# Patient Record
Sex: MALE | Race: WHITE | NOT HISPANIC OR LATINO | Employment: FULL TIME | URBAN - METROPOLITAN AREA
[De-identification: names, ages, dates, MRNs, and addresses within clinical notes are randomized per-mention and may not be internally consistent; named-entity substitution may affect disease eponyms.]

---

## 2017-01-30 ENCOUNTER — OFFICE VISIT (OUTPATIENT)
Dept: SLEEP MEDICINE | Facility: CLINIC | Age: 62
End: 2017-01-30
Payer: COMMERCIAL

## 2017-01-30 VITALS
OXYGEN SATURATION: 92 % | WEIGHT: 279.8 LBS | TEMPERATURE: 98.8 F | BODY MASS INDEX: 39.17 KG/M2 | HEIGHT: 71 IN | SYSTOLIC BLOOD PRESSURE: 139 MMHG | HEART RATE: 73 BPM | DIASTOLIC BLOOD PRESSURE: 75 MMHG

## 2017-01-30 DIAGNOSIS — G47.39 COMPLEX SLEEP APNEA SYNDROME: Primary | ICD-10-CM

## 2017-01-30 PROCEDURE — 99214 OFFICE O/P EST MOD 30 MIN: CPT | Performed by: PHYSICIAN ASSISTANT

## 2017-01-30 NOTE — PROGRESS NOTES
Sleep Study Follow-Up Visit:    Date on this visit: 1/30/2017    Adryan Govea comes in today for follow-up of his ASV use for severe mixed apnea. He was initially seen at the [facility] for complaints of snoring and concerns of RD. He presents today primarily to get a prescription for new supplies.  His sleep study on 11/25/2013 showed an AHI of 94/hr (143 of 211 events were mixed or central apneas), RDI 95/hr and O2 cyndy of 77.6%. He spent 28.9 min below 90% SpO2. His sleep was extremely fragmented and was subjectively much worse than at home.  He has been on ASV with settings of Min EPAP 5 cm, Max EPAP 12 cm, Min PS 0, Max PS 7 cm, and max pressure 12 cm, rate auto.  He has gained 30 pounds in the last year.  He is sleeping pretty well. He sometimes wakes early. He stopped using Advil PM, for the most part. He was having some morning grogginess and dizziness from it. He gets 6-7 hours per night. He switched from a full face to a nasal mask. He was getting a ring around his face from the mask. He also got a damp face. He denies dry mouth. He is not snoring with it. He has not gotten new supplies in about a year.  The compliance data shows that he has used the ASV for 29/30 nights, 93.3% of nights for >4 hours.  The 90th% EPAP is 9.7 cm, avg EPAP is 7.4 cm, 90th% PS is 2.3 cm, avg PS is 1.1 cm.  The average time in large leak is 0 sec.  The average nightly usage is 6:35.  The average AHI is 5.3/hr, 3.9/hr are hypopneas. The download shows that some of the nights with early awakenings are preceded by a night with 8-10 hours of sleep.      Past medical/surgical history, family history, social history, medications and allergies were reviewed.      Problem List:  Patient Active Problem List    Diagnosis Date Noted     History of basal cell carcinoma 01/29/2016     Priority: Medium     Skin, left posterior superior shoulder, excision  - Basal cell carcinoma, completely excised 1/20/2016       Family  history of melanoma 01/19/2016     Priority: Medium     Family history of nonmelanoma skin cancer 01/19/2016     Priority: Medium     History of colonic polyps 01/27/2015     Priority: Medium     Problem list name updated by automated process. Provider to review       Complex sleep apnea syndrome 01/20/2014     Priority: Medium     Snoring 10/30/2013     Priority: Medium     Presbyacusis 10/30/2013     Priority: Medium     Obesity 09/09/2013     Priority: Medium     Erectile dysfunction      Priority: Medium     Hyperlipidemia with target LDL less than 100      Priority: Medium     Diagnosis updated by automated process. Provider to review and confirm.          Impression/Plan:    (G47.31) Complex sleep apnea syndrome  (primary encounter diagnosis)  Comment: He is doing well with ASV. His AHI was 5.3/hr, mostly hypopneas. There was a few days in the middle of the month where his AHI was increased. In the last week or two, his AHI has been <5/hr. He sometimes wakes early, but does not seem particularly bothered by it.  Plan: Comprehensive DME        Continue ASV  Min EPAP 5 cm, Max EPAP 12 cm, Min PS 0, Max PS 7 cm, and max pressure 12 cm, rate auto. I prescribed new supplies. We spent several minutes looking at his sleep schedule to identify reasons he sometimes wakes early. He was encouraged to make sure he is not sleeping too long on some nights, and encouraged to keep a consistent bedtime and wake time.    He will follow up with me in about 2 year(s).     Twenty-five minutes spent with patient, all of which were spent face-to-face counseling, consulting, coordinating plan of care.      Bennett Goltz, PA-C    CC: No ref. provider found

## 2017-01-30 NOTE — NURSING NOTE
"Chief Complaint   Patient presents with     Sleep Problem     follow up       Initial /75 mmHg  Pulse 73  Temp(Src) 98.8  F (37.1  C) (Oral)  Ht 1.803 m (5' 11\")  Wt 126.916 kg (279 lb 12.8 oz)  BMI 39.04 kg/m2  SpO2 92% Estimated body mass index is 39.04 kg/(m^2) as calculated from the following:    Height as of this encounter: 1.803 m (5' 11\").    Weight as of this encounter: 126.916 kg (279 lb 12.8 oz).  BP completed using cuff size: regular right arm   Kaya Dye MA  [facility]      "

## 2017-01-30 NOTE — PATIENT INSTRUCTIONS

## 2017-08-12 ENCOUNTER — HEALTH MAINTENANCE LETTER (OUTPATIENT)
Age: 62
End: 2017-08-12

## 2018-05-18 DIAGNOSIS — G47.33 OSA (OBSTRUCTIVE SLEEP APNEA): Primary | ICD-10-CM

## 2018-06-11 ENCOUNTER — DOCUMENTATION ONLY (OUTPATIENT)
Dept: SLEEP MEDICINE | Facility: CLINIC | Age: 63
End: 2018-06-11

## 2018-06-11 DIAGNOSIS — G47.39 COMPLEX SLEEP APNEA SYNDROME: Primary | ICD-10-CM

## 2018-06-11 NOTE — PROGRESS NOTES
Patient came in today to [facility] have us look at his ASV machine. He said he has been getting alarms in the middle of the night and the machine will shut off. He didn't have his modem attached, so I did a manual download and scanned into [employer]. His alarms are turned off, but on [employer] machines there are some internal alarms we can not adjust. After looking at his download, and his pressure settings, I'm wondering if he was hitting his max pressure, and the machine was triggered somehow. His pressures were set to Epap min 5, max 12. Pressure support min 0, max 7 with a max pressure of 12. I think he may need a higher max pressure to see if that helps. If that doesn't take care of the issues he is having, we will send his machine in for repairs and give him a loaner to use while his is being repaired. We can also work on getting a prior auth from his insurance company to get his machine replaced early. (His 5 years is up in December of this year). I will send a pressure change to Bennett Goltz, PAC Anna, RRT  [facility]

## 2018-06-12 NOTE — PROGRESS NOTES
A download was obtained from his machine.   The compliance data shows that he has used the ASV for 30/30 nights, 86.7% of nights for >4 hours.  The 90th% EPAP is 10.3 cm, avg EPAP 8.1 cm. The 90th% PS is 2 cm, avg PS 1 cm.  The average time in large leak is 0 sec.  The average nightly usage is 5:37.  The average AHI is 4.4/hr. He spends 2.5% of the night in periodic breathing. His tidal volume is 510 mL on average. His resp rate is 19.7 bpm. There are occasions when his pressures hit the max pressure limit of 12 cm, primarily in response to hypopneas.  It appears he has regained about 40 pounds since 2015. His obstruction may be worsened.  Orders placed to change pressures back to: Epap min 5, max 15. Pressure support min 0, max 10 with a max pressure of 15 cm.

## 2018-07-06 ENCOUNTER — DOCUMENTATION ONLY (OUTPATIENT)
Dept: SLEEP MEDICINE | Facility: CLINIC | Age: 63
End: 2018-07-06

## 2018-07-06 NOTE — PROGRESS NOTES
"Patient arrived to [facility] accompanied by his wife on 7/2/18. He brings in his sleep machine, [employer] System One 60S BIPAP ASV reporting alarm activating with error message \"in-operable service required\". Machine was previously repaired 1/7/2018. Loaner machine provided. Pt is eligible for replacement machine 12/18/2018. Will submit prior authorization request for early replacement. Device may be more cost effective to replace vs repair. Pt would also like to f/u with sleep provider to discuss therapy and will call clinic to schedule.   "

## 2018-07-09 ENCOUNTER — TRANSFERRED RECORDS (OUTPATIENT)
Dept: HEALTH INFORMATION MANAGEMENT | Facility: CLINIC | Age: 63
End: 2018-07-09

## 2018-07-16 ENCOUNTER — OFFICE VISIT (OUTPATIENT)
Dept: SLEEP MEDICINE | Facility: CLINIC | Age: 63
End: 2018-07-16
Payer: COMMERCIAL

## 2018-07-16 ENCOUNTER — TELEPHONE (OUTPATIENT)
Dept: FAMILY MEDICINE | Facility: CLINIC | Age: 63
End: 2018-07-16

## 2018-07-16 VITALS
DIASTOLIC BLOOD PRESSURE: 76 MMHG | WEIGHT: 283 LBS | OXYGEN SATURATION: 97 % | BODY MASS INDEX: 39.62 KG/M2 | HEART RATE: 71 BPM | HEIGHT: 71 IN | SYSTOLIC BLOOD PRESSURE: 119 MMHG | RESPIRATION RATE: 16 BRPM

## 2018-07-16 DIAGNOSIS — G47.39 COMPLEX SLEEP APNEA SYNDROME: Primary | ICD-10-CM

## 2018-07-16 PROCEDURE — 99214 OFFICE O/P EST MOD 30 MIN: CPT | Performed by: PHYSICIAN ASSISTANT

## 2018-07-16 NOTE — PATIENT INSTRUCTIONS

## 2018-07-16 NOTE — NURSING NOTE
"No chief complaint on file.      Initial /76  Pulse 71  Resp 16  Ht 1.803 m (5' 11\")  Wt 128.4 kg (283 lb)  SpO2 97%  BMI 39.47 kg/m2 Estimated body mass index is 39.47 kg/(m^2) as calculated from the following:    Height as of this encounter: 1.803 m (5' 11\").    Weight as of this encounter: 128.4 kg (283 lb).    Medication Reconciliation: complete    ESS 1    Megan Florez MA      "

## 2018-07-16 NOTE — MR AVS SNAPSHOT
After Visit Summary   7/16/2018    Adryan Govea    MRN: [id number]           Patient Information     Date Of Birth          1955        Visit Information        Provider Department      7/16/2018 2:00 PM Goltz, Bennett Ezra, PA-C [facility]        Today's Diagnoses     Complex sleep apnea syndrome    -  1      Care Instructions      Your BMI is Body mass index is 39.47 kg/(m^2).  Weight management is a personal decision.  If you are interested in exploring weight loss strategies, the following discussion covers the approaches that may be successful. Body mass index (BMI) is one way to tell whether you are at a healthy weight, overweight, or obese. It measures your weight in relation to your height.  A BMI of 18.5 to 24.9 is in the healthy range. A person with a BMI of 25 to 29.9 is considered overweight, and someone with a BMI of 30 or greater is considered obese. More than two-thirds of American adults are considered overweight or obese.  Being overweight or obese increases the risk for further weight gain. Excess weight may lead to heart disease and diabetes.  Creating and following plans for healthy eating and physical activity may help you improve your health.  Weight control is part of healthy lifestyle and includes exercise, emotional health, and healthy eating habits. Careful eating habits lifelong are the mainstay of weight control. Though there are significant health benefits from weight loss, long-term weight loss with diet alone may be very difficult to achieve- studies show long-term success with dietary management in less than 10% of people. Attaining a healthy weight may be especially difficult to achieve in those with severe obesity. In some cases, medications, devices and surgical management might be considered.  What can you do?  If you are overweight or obese and are interested in methods for weight loss, you should discuss this with your provider.      Consider reducing daily calorie intake by 500 calories.     Keep a food journal.     Avoiding skipping meals, consider cutting portions instead.    Diet combined with exercise helps maintain muscle while optimizing fat loss. Strength training is particularly important for building and maintaining muscle mass. Exercise helps reduce stress, increase energy, and improves fitness. Increasing exercise without diet control, however, may not burn enough calories to loose weight.       Start walking three days a week 10-20 minutes at a time    Work towards walking thirty minutes five days a week     Eventually, increase the speed of your walking for 1-2 minutes at time    In addition, we recommend that you review healthy lifestyles and methods for weight loss available through the National Institutes of Health patient information sites:  http://win.niddk.nih.gov/publications/index.htm    And look into health and wellness programs that may be available through your health insurance provider, employer, local community center, or rosy club.    Weight management plan: Patient was referred to their PCP to discuss a diet and exercise plan.              Follow-ups after your visit        Your next 10 appointments already scheduled     Jul 26, 2018  8:30 AM CDT   Return Sleep Patient with Bennett Ezra Goltz, PA-C   [facility] ([facility])    [address] [place] [address]   [phone]            Sep 10, 2018  1:00 PM CDT   New Patient Visit with Rodriguez Eric MD   [facility])    [address]  [facility] [address]   [phone]              Who to contact     If you have questions or need follow up information about today's clinic visit or your schedule please contact [facility] directly at [phone].  Normal or non-critical lab and imaging results will be  "communicated to you by MyChart, letter or phone within 4 business days after the clinic has received the results. If you do not hear from us within 7 days, please contact the clinic through [employer] or phone. If you have a critical or abnormal lab result, we will notify you by phone as soon as possible.  Submit refill requests through [employer] or call your pharmacy and they will forward the refill request to us. Please allow 3 business days for your refill to be completed.          Additional Information About Your Visit        [employer] Information     [employer] gives you secure access to your electronic health record. If you see a primary care provider, you can also send messages to your care team and make appointments. If you have questions, please call your primary care clinic.  If you do not have a primary care provider, please call [phone] and they will assist you.        Care EveryWhere ID     This is your Care EveryWhere ID. This could be used by other organizations to access your [place] medical records  [id number]        Your Vitals Were     Pulse Respirations Height Pulse Oximetry BMI (Body Mass Index)       71 16 1.803 m (5' 11\") 97% 39.47 kg/m2        Blood Pressure from Last 3 Encounters:   07/16/18 119/76   01/30/17 139/75   10/06/16 130/80    Weight from Last 3 Encounters:   07/16/18 128.4 kg (283 lb)   01/30/17 126.9 kg (279 lb 12.8 oz)   10/06/16 126.1 kg (278 lb)              We Performed the Following     Comprehensive DME        Primary Care Provider Office Phone # Fax #    Anthony Jake Duckworth [phone] [address] [facility] [address]        Equal Access to Services     [facility] : Hadii aad ku hadasho Soadalbertoali, waaxda luqadaha, qaybta kaalmada berthayada, felipe retana . So [facility] [phone].    ATENCIÓN: Si habla español, tiene a jesus disposición servicios gratuitos de asistencia lingüística. Llame al [phone].    We comply " with applicable federal civil rights laws and [place] laws. We do not discriminate on the basis of race, color, national origin, age, disability, sex, sexual orientation, or gender identity.            Thank you!     Thank you for choosing [facility]  for your care. Our goal is always to provide you with excellent care. Hearing back from our patients is one way we can continue to improve our services. Please take a few minutes to complete the written survey that you may receive in the mail after your visit with us. Thank you!             Your Updated Medication List - Protect others around you: Learn how to safely use, store and throw away your medicines at www.disposemymeds.org.          This list is accurate as of 7/16/18  2:55 PM.  Always use your most recent med list.                   Brand Name Dispense Instructions for use Diagnosis    clobetasol 0.05 % cream    TEMOVATE    15 g    Apply sparingly to affected area twice daily for 14 days.  Do not apply to face.    Other eczema       simvastatin 40 MG tablet    ZOCOR    90 tablet    Take 1 tablet (40 mg) by mouth At Bedtime    Mixed hyperlipidemia

## 2018-07-16 NOTE — TELEPHONE ENCOUNTER
Panel Management Review      Patient has the following on his problem list: None      Composite cancer screening  Chart review shows that this patient is due/due soon for the following None  Summary:    Patient is due/failing the following:   PHYSICAL    Action needed:   Patient needs office visit for physical  .    Type of outreach:    Sent my chart message    Questions for provider review:    None                                                                                                                                    Arlene Rolle CMA

## 2018-07-16 NOTE — PROGRESS NOTES
ASV Follow-Up Visit:    Date on this visit: 7/16/2018    Adryan Govea comes in today for follow-up of his ASV use for severe mixed apnea. He was initially seen at the [facility] for complaints of snoring and concerns of RD.   His sleep study on 11/25/2013 showed an AHI of 94/hr (143 of 211 events were mixed or central apneas), RDI 95/hr and O2 cyndy of 77.6%. He spent 28.9 min below 90% SpO2. His sleep was extremely fragmented and was subjectively much worse than at home.  He is on ASV with pressures: Epap min 5, max 15. Pressure support min 0, max 10 with a max pressure of 15 cm.   He presents today because his ASV is broken again. He is on a loaner. It would stop working in the night and the alarms would go off. His machine was repaired earlier in the year and worked fine for a few months before breaking again. The loaner machine works just fine. His wife says he sleeps well with it. Without CPAP, he is observed to have snorts and pauses in breathing. He has a nasal mask. No dry nose or mouth. He denies mask leak. He used to get mask leak with a full face mask.   He weighed 279 at his last visit here on 1/30/2017. He weighs 283 today.  He notices gasping himself awake if he sleeps without the machine.   The compliance data from the loaner ASV shows that from 7/2/2018 to 7/15/2018 he has used the ASV for 14/14 nights, 92.9% of nights for >4 hours.  The 90th% EPAP is 9.4 cm and 90th% PS is 3.2 cm.  The average time in large leak is 0 sec.  The average nightly usage is 6:10.  He spend 1.1% of the night in periodic breathing. He triggers 97.6% of his own breaths. The average AHI is 2.6/hr (mostly hypopneas). Avg Resp rate is 18.6 bpm, avg minute vent 9.2 lpm    Past medical/surgical history, family history, social history, medications and allergies were reviewed.      Problem List:  Patient Active Problem List    Diagnosis Date Noted     History of basal cell carcinoma 01/29/2016     Priority:  Medium     Skin, left posterior superior shoulder, excision  - Basal cell carcinoma, completely excised 1/20/2016       Family history of melanoma 01/19/2016     Priority: Medium     Family history of nonmelanoma skin cancer 01/19/2016     Priority: Medium     History of colonic polyps 01/27/2015     Priority: Medium     Problem list name updated by automated process. Provider to review       Complex sleep apnea syndrome 01/20/2014     Priority: Medium     Snoring 10/30/2013     Priority: Medium     Presbyacusis 10/30/2013     Priority: Medium     Obesity 09/09/2013     Priority: Medium     Erectile dysfunction      Priority: Medium     Hyperlipidemia with target LDL less than 100      Priority: Medium     Diagnosis updated by automated process. Provider to review and confirm.          Impression/Plan:    (G47.31) Complex sleep apnea syndrome  (primary encounter diagnosis)  Comment: Adryan's ASV broke. He may not be due for coverage of a new machine until December. He would like to know if he really needs the ASV or if a CPAP would suffice. His download shows he is triggering 97.6% of his own breaths and the 90th% PS is 3 cm. It appears to give him the PS in response to hypopneas.   Plan: Comprehensive DME        I changed his machine so that the EPAP will vary from 6 to 15 cm and the PS will be fixed at 0, so the machine will act like a CPAP. I will have him use this for 2 weeks to see if he has increased AHI (centrals in particular). If he does, I will recommend he stay with ASV. If his AHI is well controlled, he may do fine with CPAP when he gets a new machine.       He will follow up with me in about 2 week(s). He will be out of town in [place] and [place] after that, and I think he would benefit from ASV at altitude.    Twenty-five minutes spent with patient, all of which were spent face-to-face counseling, consulting, coordinating plan of care.      Bennett Goltz, PA-C    CC: No ref. provider found

## 2018-07-16 NOTE — LETTER
July 16, 2018    Adryan Govea  [address]   [facility] [address]    Dear Chelsey Cornelius cares about your health and your health plan.  I have reviewed your medical conditions, medication list and lab results, and am making recommendations based on this review to better manage your health.    You are in particular need of attention regarding:  -Wellness (Physical) Visit     I am recommending that you:     -schedule a WELLNESS (Physical) APPOINTMENT with me.   I will check fasting labs the same day - nothing to eat except water and meds for 8-10 hours prior.      Please call us at the [facility] location:  [phone] or use [employer] to address the above recommendations.     Thank you for trusting [facility].  We appreciate the opportunity to serve you and look forward to supporting your healthcare in the future.    If you have (or plan to have) any of these tests done at a facility other than a [facility] or a [facility], please have the results sent to the [facility] location noted above.      Best Regards,    Anthony Duckworth MD

## 2018-07-18 DIAGNOSIS — G47.39 COMPLEX SLEEP APNEA SYNDROME: Primary | ICD-10-CM

## 2018-07-18 NOTE — PROGRESS NOTES
Adryan brought his ASV in today asking that the settings be changed back to ASV. I had set it to CPAP 6-15 cm (I had the PS fixed at 0). He felt he was not getting enough air and he struggled to sleep. The download does not look particularly different from preceding days with his prior ASV settings.   I have changed his machine back to the previous settings and placed an order for a replacement machine.  Bennett Goltz, PA-C

## 2018-08-13 ENCOUNTER — DOCUMENTATION ONLY (OUTPATIENT)
Dept: SLEEP MEDICINE | Facility: CLINIC | Age: 63
End: 2018-08-13

## 2018-08-13 NOTE — PROGRESS NOTES
Patient seen at [facility] today 8/13/18. He returned loaner ASV device. His unit was sent to [employer] and repaired under warranty at no cost. Device set to new settings per CMN. Pt has opted to wait to replace device. He has not met his deductible. Pt will f/u with [facility] when he decides to move forward with replacement. [employer] applied to device for monitoring.

## 2018-09-10 ENCOUNTER — OFFICE VISIT (OUTPATIENT)
Dept: FAMILY MEDICINE | Facility: CLINIC | Age: 63
End: 2018-09-10
Payer: COMMERCIAL

## 2018-09-10 VITALS
HEIGHT: 71 IN | OXYGEN SATURATION: 96 % | SYSTOLIC BLOOD PRESSURE: 136 MMHG | HEART RATE: 57 BPM | TEMPERATURE: 97.9 F | WEIGHT: 283.04 LBS | BODY MASS INDEX: 39.62 KG/M2 | DIASTOLIC BLOOD PRESSURE: 87 MMHG

## 2018-09-10 DIAGNOSIS — Z11.59 NEED FOR HEPATITIS C SCREENING TEST: Primary | ICD-10-CM

## 2018-09-10 DIAGNOSIS — M25.551 HIP PAIN, RIGHT: ICD-10-CM

## 2018-09-10 DIAGNOSIS — E66.9 OBESITY, UNSPECIFIED OBESITY SEVERITY, UNSPECIFIED OBESITY TYPE: ICD-10-CM

## 2018-09-10 DIAGNOSIS — E78.5 HYPERLIPIDEMIA WITH TARGET LDL LESS THAN 100: ICD-10-CM

## 2018-09-10 DIAGNOSIS — Z12.5 SCREENING FOR PROSTATE CANCER: ICD-10-CM

## 2018-09-10 DIAGNOSIS — Z13.1 SCREENING FOR DIABETES MELLITUS: ICD-10-CM

## 2018-09-10 LAB
ALBUMIN SERPL-MCNC: 4 G/DL (ref 3.2–4.5)
ALP SERPL-CCNC: 62 U/L (ref 40–150)
ALT SERPL-CCNC: 65 U/L (ref 0–70)
AST SERPL-CCNC: 50 U/L (ref 0–55)
BILIRUB SERPL-MCNC: 0.9 MG/DL (ref 0.2–1.3)
BUN SERPL-MCNC: 14 MG/DL (ref 7–30)
CALCIUM SERPL-MCNC: 9.5 MG/DL (ref 8.5–10.4)
CHLORIDE SERPLBLD-SCNC: 100 MMOL/L (ref 94–109)
CHOLEST SERPL-MCNC: 274 MG/DL (ref 0–200)
CHOLEST/HDLC SERPL: 5.3 {RATIO} (ref 0–5)
CO2 SERPL-SCNC: 30 MMOL/L (ref 20–32)
CREAT SERPL-MCNC: 1.1 MG/DL (ref 0.8–1.5)
EGFR CALCULATED (BLACK REFERENCE): 86.9
EGFR CALCULATED (NON BLACK REFERENCE): 71.9
FASTING SPECIMEN: YES
GLUCOSE SERPL-MCNC: 95 MG/DL (ref 60–109)
HDLC SERPL-MCNC: 52 MG/DL
LDLC SERPL CALC-MCNC: 183 MG/DL (ref 0–129)
POTASSIUM SERPL-SCNC: 4.4 MMOL/L (ref 3.4–5.3)
PROT SERPL-MCNC: 7.5 G/DL (ref 6.8–8.8)
PSA SERPL-ACNC: 0.74 UG/L (ref 0–4)
SODIUM SERPL-SCNC: 144 MMOL/L (ref 133–144)
TRIGL SERPL-MCNC: 200 MG/DL (ref 0–150)
VLDL-CHOLESTEROL: 40 (ref 7–32)

## 2018-09-10 RX ORDER — ATORVASTATIN CALCIUM 20 MG/1
20 TABLET, FILM COATED ORAL DAILY
Qty: 90 TABLET | Refills: 1 | Status: SHIPPED | OUTPATIENT
Start: 2018-09-10 | End: 2020-08-05

## 2018-09-10 ASSESSMENT — PAIN SCALES - GENERAL: PAINLEVEL: MODERATE PAIN (4)

## 2018-09-10 NOTE — NURSING NOTE
"63 year old  Chief Complaint   Patient presents with     [facility] Care     Physical       Blood pressure 136/87, pulse 57, temperature 97.9  F (36.6  C), temperature source Oral, height 5' 11.2\" (180.8 cm), weight 283 lb 0.6 oz (128.4 kg), SpO2 96 %. Body mass index is 39.25 kg/(m^2).  Patient Active Problem List   Diagnosis     Erectile dysfunction     Hyperlipidemia with target LDL less than 100     Obesity     Snoring     Presbyacusis     Complex sleep apnea syndrome     History of colonic polyps     Family history of melanoma     Family history of nonmelanoma skin cancer     History of basal cell carcinoma       Wt Readings from Last 2 Encounters:   09/10/18 283 lb 0.6 oz (128.4 kg)   07/16/18 283 lb (128.4 kg)     BP Readings from Last 3 Encounters:   09/10/18 136/87   07/16/18 119/76   01/30/17 139/75         Current Outpatient Prescriptions   Medication     clobetasol (TEMOVATE) 0.05 % cream     simvastatin (ZOCOR) 40 MG tablet     No current facility-administered medications for this visit.        Social History   Substance Use Topics     Smoking status: Current Some Day Smoker     Types: Cigars     Smokeless tobacco: Never Used     Alcohol use Yes       Health Maintenance Due   Topic Date Due     HEPATITIS C SCREENING  01/25/1973     ADVANCE DIRECTIVE PLANNING Q5 YRS  01/25/2010     PHQ-2 Q1 YR  03/09/2017     INFLUENZA VACCINE (1) 09/01/2018       No results found for: NOA Rao CMA  September 10, 2018 1:01 PM  "

## 2018-09-10 NOTE — MR AVS SNAPSHOT
"              After Visit Summary   9/10/2018    Adryan Govea    MRN: [id number]           Patient Information     Date Of Birth          1955        Visit Information        Provider Department      9/10/2018 1:00 PM Rodriguez Eric MD [facility]        Today's Diagnoses     Need for hepatitis C screening test    -  1    Hyperlipidemia with target LDL less than 100        Screening for prostate cancer        Screening for diabetes mellitus        Obesity, unspecified obesity severity, unspecified obesity type        Hip pain, right          Care Instructions    64 yo male with obesity, elevated blood pressure today (but not chronic), hyperlipidemia (off of meds)  Also, Right hip pain, has had x-rays that were reported as normal.    Colonic polyps, followed by GI. Continue with annual colonoscopies.       Regarding the diet, recommendation would be to eat some breakfast, ideally high in protein. Also, vegetables early in the day. Minimize dessert.     Regarding activity, look into a standing work area, a physioball (65 cm, non-burst) and a stationary cycle (e.g. Recumbent) at home  Aim to increase steps to about 6000 per day. Outdoor walking is healthy  Sent to Physical therapy for the Right hip. If no improvement, then start imaging.   Also, look into [place] Walking Poles. This would be great for walking and take pressure off of the hip.     Weight goal should be about 5% in 3 months, a little over a pound/week with a goal of < 270 lbs by Dec 2018.     Will check labs and send results via the \"[employer]\" system.   Likely will need to start Statin medication.           Follow-ups after your visit        Additional Services     [facility] PT, HAND, AND CHIR[facility]RACTIC REFERRAL       **This order will print in the [facility] Scheduling Office**    Physical Therapy, Hand Therapy and Chiropractic Care are available through:    *[facility]  *[facility]  *[facility] " "Care    Call one number to schedule at any of the above locations: [phone].    Your provider has referred you to: Physical Therapy at [facility] or [facility]    Indication/Reason for Referral: Right hip pain  Onset of Illness: years  Therapy Orders: Evaluate and Treat      Please be aware that coverage of these services is subject to the terms and limitations of your health insurance plan.  Call member services at your health plan with any benefit or coverage questions.      Please bring the following to your appointment:    *Your personal calendar for scheduling future appointments  *Comfortable clothing                  Who to contact     Please call your clinic at [phone] to:    Ask questions about your health    Make or cancel appointments    Discuss your medicines    Learn about your test results    Speak to your doctor            Additional Information About Your Visit        [employer] Information     [employer] gives you secure access to your electronic health record. If you see a primary care provider, you can also send messages to your care team and make appointments. If you have questions, please call your primary care clinic.  If you do not have a primary care provider, please call [phone] and they will assist you.      [employer] is an electronic gateway that provides easy, online access to your medical records. With [employer], you can request a clinic appointment, read your test results, renew a prescription or communicate with your care team.     To access your existing account, please contact your [facility] or call [phone] for assistance.        Care EveryWhere ID     This is your Care EveryWhere ID. This could be used by other organizations to access your [facility] medical records  [id number]        Your Vitals Were     Pulse Temperature Height Pulse Oximetry BMI (Body Mass Index)       57 97.9  F (36.6  C) (Oral) 5' 11.2\" (180.8 cm) 96% 39.25 kg/m2        Blood " Pressure from Last 3 Encounters:   09/10/18 136/87   07/16/18 119/76   01/30/17 139/75    Weight from Last 3 Encounters:   09/10/18 283 lb 0.6 oz (128.4 kg)   07/16/18 283 lb (128.4 kg)   01/30/17 279 lb 12.8 oz (126.9 kg)              We Performed the Following     Comprehensive Metabolic Panel ([place])     Hepatitis C Screen Reflex to HCV RNA Quant and Genotype     [facility], AND CH[facility]RACT[facility] REFERRAL     Lipid Panel ([place])     Prostate spec antigen screen        Primary Care Provider Office Phone # Fax #    Anthony Duckworth [phone] [address] [facility] [address]        Equal Access to Services     KODI OCAMPO : Hadii sienna vicenteo Sobrenda, waaxda luqadaha, qaybta kaalmada adeegyada, felipe low. So [facility] [phone].    ATENCIÓN: Si habla español, tiene a jesus disposición servicios gratuitos de asistencia lingüística. Llame al [phone].    We comply with applicable federal civil rights laws and [place] laws. We do not discriminate on the basis of race, color, national origin, age, disability, sex, sexual orientation, or gender identity.            Thank you!     Thank you for choosing [facility]  for your care. Our goal is always to provide you with excellent care. Hearing back from our patients is one way we can continue to improve our services. Please take a few minutes to complete the written survey that you may receive in the mail after your visit with us. Thank you!             Your Updated Medication List - Protect others around you: Learn how to safely use, store and throw away your medicines at www.disposemymeds.org.          This list is accurate as of 9/10/18  1:51 PM.  Always use your most recent med list.                   Brand Name Dispense Instructions for use Diagnosis    clobetasol 0.05 % cream    TEMOVATE    15 g    Apply sparingly to affected area twice daily for 14 days.  Do not apply to face.     Other eczema       simvastatin 40 MG tablet    ZOCOR    90 tablet    Take 1 tablet (40 mg) by mouth At Bedtime    Mixed hyperlipidemia

## 2018-09-10 NOTE — PATIENT INSTRUCTIONS
"64 yo male with obesity, elevated blood pressure today (but not chronic), hyperlipidemia (off of meds)  Also, Right hip pain, has had x-rays that were reported as normal.    Colonic polyps, followed by GI. Continue with annual colonoscopies.       Regarding the diet, recommendation would be to eat some breakfast, ideally high in protein. Also, vegetables early in the day. Minimize dessert.     Regarding activity, look into a standing work area, a physioball (65 cm, non-burst) and a stationary cycle (e.g. Recumbent) at home  Aim to increase steps to about 6000 per day. Outdoor walking is healthy  Sent to Physical therapy for the Right hip. If no improvement, then start imaging.   Also, look into [place] Walking Poles. This would be great for walking and take pressure off of the hip.     Weight goal should be about 5% in 3 months, a little over a pound/week with a goal of < 270 lbs by Dec 2018.     Will check labs and send results via the \"[employer]\" system.   Likely will need to start Statin medication.   "

## 2018-09-10 NOTE — PROGRESS NOTES
REASON FOR VISIT: Annual Physical, Establish Care      HISTORY OF PRESENT ILLNESS: Adryan Govea is a 63 year old male who presents for an annual physical. Adryan is new to [facility]. His previous PCP Dr. Anthony Jenkins at [facility] is retiring. He was recently remarried, his wife (Cassandra) is also establishing care at [facility].     He reports RIGHT hip pain for 5-10 years. Worsening over the past two years. He has had x-rays twice of his hip with orthopedics, his last x-ray was over 1 year ago. He has not done physical therapy in the past. He has been having massages regularly. He does report numbness down his RIGHT leg when walking.     Adryan has known high cholesterol, he has not been taking a statin. No history of heart attacks or strokes.     He has sleep apnea and follows with Dr. Burks at [facility].     He reports that his weight is up from normal. He is interested in losing weight we discussed increasing his exercise level and modifying his diet.     Adryan doesn't typically eat breakfast. First meal of the day is usually lunch which is usually leftovers. He tries to eat a low carbohydrate diet with lean meats, vegetables. He does note that he overindulges on sweets. He doesn't drink any sugar sweetened beverages.     For physical activity he golfs, bikes, and sails. At the gym he will bike for 20 minutes, stretch, and some weight machines. He also downhill skis frequently. He wears a fit bit and walks around 4,000 steps a day. Exercise limited by his RIGHT hip pain. Walking is worse for his hip than a bicycle.     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  He is remarried (Cassandra), two kids age 35 and 32.        MEDICATIONS:  Not currently taking any medications. Was prescribed Simvistatin previously.        HEALTHCARE MAINTENANCE:      He smokes a cigar 3x per week. He stopped smoking cigarettes years ago.     Adryan has had  "40-50 colonic polyps removed. He sees Dr. Fu at [facility] in [place]. His last colonoscopy was a few months ago. He has annual colonoscopies.     Immunizations are up to date, denies flu vaccine today. Tdap due in 2026.     Adryan has 2 drinks per night; 4x per week.        Health Maintenance   Topic Date Due     HEPATITIS C SCREENING  01/25/1973     ADVANCE DIRECTIVE PLANNING Q5 YRS  01/25/2010     PHQ-2 Q1 YR  03/09/2017     INFLUENZA VACCINE (1) 09/01/2018     COLONOSCOPY Q1 YR  07/09/2019     LIPID SCREEN Q5 YR MALE (SYSTEM ASSIGNED)  03/09/2021     TETANUS IMMUNIZATION (SYSTEM ASSIGNED)  03/09/2026     HIV SCREEN (SYSTEM ASSIGNED)  Completed     REVIEW OF SYSTEMS:  POSITIVE for: RIGHT hip pain, history of colonic polyps, RD, known hyperlipidemia no current statin use.   No chest pain or shortness of breath.   No GI or  complaints      OBJECTIVE:    EXAM:  GENERAL: Alert, pleasant, NAD  VITAL SIGNS: /87 (BP Location: Left arm, Patient Position: Chair, Cuff Size: Adult Large)  Pulse 57  Temp 97.9  F (36.6  C) (Oral)  Ht 5' 11.2\" (180.8 cm)  Wt 283 lb 0.6 oz (128.4 kg)  SpO2 96%  BMI 39.25 kg/m2  EYES: PERRL, EOMI, conjunctiva clear  HENT: TM normal bilaterally. Nose and mouth without lesions.  NECK: No adenopathy, thyroid normal to palpation  RESP: Lungs clear to auscultation bilaterally  Axillae: No palpable axillary masses or adenopathy  CV: Regular rate and rhythm, normal S1 S2, No murmur, no carotid bruits  ABDOMEN: Soft, nontender, without HSM or masses. Bowel sounds normal    and Rectal - Deferred. Patient had no concerns and receives annual colonoscopies.   MS:RIGHT hand long digit and ring finger with few old fractures notable. Full function.  RIGHT hip: 90 degrees of flexion and 5 degrees of internal range of motion 40 degrees of external range of motion.   LEFT hip: 100 degrees of flexion. 20 degrees of internal ROM. 35 degrees of external ROM.    LEFT foot with " "calcified nodule over dorsum of midfoot, just above the navicular bone, non painful. Present for 10 years.   Calluses more on the right foot than the left.   SKIN: No suspicious lesions or rashes  NEURO: Normal strength and tone, sensory exam grossly normal.  PSYCH: Mentation appears normal. Affect normal/bright.  EXT: No peripheral edema      LABORATORY DATA: PSA 50, CMP, Lipid panel, Hepatitis C screening      ASSESSMENT AND PLAN:   62 yo male with obesity, elevated blood pressure today (but not chronic), hyperlipidemia (off of meds)  Also, Right hip pain, has had x-rays that were reported as normal.    Colonic polyps, followed by GI. Continue with annual colonoscopies.     PLAN:  Regarding the diet, recommendation would be to eat some breakfast, ideally high in protein. Also, vegetables early in the day. Minimize dessert.     Regarding activity, look into a standing work area, a physioball (65 cm, non-burst) and a stationary cycle (e.g. Recumbent) at home  Aim to increase steps to about 6000 per day. Outdoor walking is healthy  Sent to Physical therapy for the Right hip. If no improvement, then start imaging.   Also, look into [place] Walking Poles. This would be great for walking and take pressure off of the hip.     Weight goal should be about 5% in 3 months, a little over a pound/week with a goal of < 270 lbs by Dec 2018.     Will check labs and send results via the \"[employer]\" system.   Likely will need to start Statin medication.     Call with any problems or questions.       I, Leela Edwards, am serving as a scribe to document services personally performed by Dr. Rodriguez Eric, based on data collection and the provider's statements to me.     --Rodriguez Eric MD  "

## 2018-09-11 LAB — HCV AB SERPL QL IA: NONREACTIVE

## 2018-09-17 ENCOUNTER — THERAPY VISIT (OUTPATIENT)
Dept: PHYSICAL THERAPY | Facility: CLINIC | Age: 63
End: 2018-09-17
Attending: FAMILY MEDICINE
Payer: COMMERCIAL

## 2018-09-17 DIAGNOSIS — M25.551 HIP PAIN, RIGHT: Primary | ICD-10-CM

## 2018-09-17 PROCEDURE — 97110 THERAPEUTIC EXERCISES: CPT | Mod: GP | Performed by: PHYSICAL THERAPIST

## 2018-09-17 PROCEDURE — 97112 NEUROMUSCULAR REEDUCATION: CPT | Mod: GP | Performed by: PHYSICAL THERAPIST

## 2018-09-17 PROCEDURE — 97161 PT EVAL LOW COMPLEX 20 MIN: CPT | Mod: GP | Performed by: PHYSICAL THERAPIST

## 2018-09-17 ASSESSMENT — ACTIVITIES OF DAILY LIVING (ADL)
STEPPING_UP_AND_DOWN_CURBS: NO DIFFICULTY AT ALL
DEEP_SQUATTING: SLIGHT DIFFICULTY
WALKING_DOWN_STEEP_HILLS: SLIGHT DIFFICULTY
WALKING_APPROXIMATELY_10_MINUTES: SLIGHT DIFFICULTY
RECREATIONAL_ACTIVITIES: SLIGHT DIFFICULTY
GOING_UP_1_FLIGHT_OF_STAIRS: NO DIFFICULTY AT ALL
TWISTING/PIVOTING_ON_INVOLVED_LEG: NO DIFFICULTY AT ALL
WALKING_INITIALLY: NO DIFFICULTY AT ALL
GETTING_INTO_AND_OUT_OF_A_BATHTUB: NO DIFFICULTY AT ALL
SITTING_FOR_15_MINUTES: NO DIFFICULTY AT ALL
LIGHT_TO_MODERATE_WORK: SLIGHT DIFFICULTY
HOS_ADL_SCORE(%): 83.82
GETTING_INTO_AND_OUT_OF_AN_AVERAGE_CAR: NO DIFFICULTY AT ALL
HOS_ADL_ITEM_SCORE_TOTAL: 57
WALKING_UP_STEEP_HILLS: SLIGHT DIFFICULTY
HEAVY_WORK: MODERATE DIFFICULTY
GOING_DOWN_1_FLIGHT_OF_STAIRS: NO DIFFICULTY AT ALL
HOS_ADL_COUNT: 17
HOS_ADL_HIGHEST_POTENTIAL_SCORE: 68
STANDING_FOR_15_MINUTES: SLIGHT DIFFICULTY
HOW_WOULD_YOU_RATE_YOUR_CURRENT_LEVEL_OF_FUNCTION_DURING_YOUR_USUAL_ACTIVITIES_OF_DAILY_LIVING_FROM_0_TO_100_WITH_100_BEING_YOUR_LEVEL_OF_FUNCTION_PRIOR_TO_YOUR_HIP_PROBLEM_AND_0_BEING_THE_INABILITY_TO_PERFORM_ANY_OF_YOUR_USUAL_DAILY_ACTIVITIES?: 95
PUTTING_ON_SOCKS_AND_SHOES: NO DIFFICULTY AT ALL
WALKING_15_MINUTES_OR_GREATER: MODERATE DIFFICULTY
ROLLING_OVER_IN_BED: NO DIFFICULTY AT ALL

## 2018-09-17 NOTE — PROGRESS NOTES
[facility] Initial Evaluation  Subjective:  Patient is a 63 year old male presenting with rehab right hip hpi.   Adryan Govea is a 63 year old male with a right hip condition.  Condition occurred with:  Insidious onset.  Condition occurred: for unknown reasons.  This is a chronic condition  Pt presents to clinic with complaints of posterior R hip pain, gradually worsening over the past 2 months. Pt noticing pain/difficulty with standing and walking, with pain radiating into lateral thigh/leg. Pt had MD appointment on 9/10/18 and referred to PT. .    Patient reports pain:  Lateral and posterior.  Radiates to:  Thigh.  Pain is described as aching and is intermittent and reported as 6/10.  Associated symptoms:  Loss of motion/stiffness, numbness and tingling. Pain is worse in the P.M..  Symptoms are exacerbated by standing, walking, certain positions and bending/squatting and relieved by rest, activity/movement and NSAID's (massage).  Since onset symptoms are gradually worsening.  Special testing: none.  Previous treatment includes chiropractic.  There was no improvement following previous treatment.  General health as reported by patient is good.                  Barriers include:  None as reported by the patient.    Red flags:  None as reported by the patient.                        Objective:        Flexibility/Screens:       Lower Extremity:  Decreased left lower extremity flexibility:IT Band; Quadriceps and Hamstrings    Decreased right lower extremity flexibility:  IT Band; Quadriceps and Hamstrings               Lumbar/SI Evaluation  ROM:    AROM Lumbar:   Flexion:            To mid tibia +hamstring tightness  Ext:                    75%   Side Bend:        Left:  75%    Right:  75%  Rotation:           Left:  75%    Right:  75%   Side Glide:        Left:     Right:         Strength: poor abdominal strength  Lumbar Myotomes:  normal                Lumbar Dermtomes:   normal                Neural Tension/Mobility:      Left side:SLR; SLR w/DF or Slump  negative.   Right side:   Slump positive.  Right side:   SLR w/DF or SLR  negative.   Lumbar Palpation:      Tenderness not present at Left:    Quadratus Lumborum; Erector Spinae or PSIS  Tenderness present at Right: Piriformis  Tenderness not present at Right:  Quadratus Lumborum; Erector Spinae or PSIS                                          Hip Evaluation  Hip PROM:    Flexion: Left: 110   Right: 110 deg     Abduction: Left: 35    Right: 35    Internal Rotation: Left: 25    Right: 10  External Rotation: Left: 45    Right: 40              Hip Strength:    Flexion:   Left: 5/5   Pain:  Right: 5/5   Pain:                    Extension:  Left: 5-/5  Pain:Right: 4/5    Pain:    Abduction:  Left: 5-/5     Pain:Right: 4/5    Pain:                  Hip Special Testing:      Left hip negative for the following special tests:  Sabina's   Right hip positive for the following special tests:  Sabina'sRight hip negative for the following special tests:  Lorena or Fadir/Labrum    Hip Palpation:      Right hip tenderness present at:  hip flexors and Piriformis  Functional Testing:          Quad:      Bilateral leg squat:  Mild loss of control and excessive anterior knee excursion                  General     ROS    Assessment/Plan:    Patient is a 63 year old male with right side hip complaints.    Patient has the following significant findings with corresponding treatment plan.                Diagnosis 1:  R hip pain  Pain -  hot/cold therapy, manual therapy, self management, education and home program  Decreased ROM/flexibility - manual therapy, therapeutic exercise, therapeutic activity and home program  Decreased strength - therapeutic exercise, therapeutic activities and home program  Impaired gait - gait training, assistive devices and home program  Impaired muscle performance - neuro re-education and home program  Decreased function -  therapeutic activities and home program    Therapy Evaluation Codes:   1) History comprised of:   Personal factors that impact the plan of care:      None.    Comorbidity factors that impact the plan of care are:      Overweight, Sleep disorder/apnea and Smoking.     Medications impacting care: None.  2) Examination of Body Systems comprised of:   Body structures and functions that impact the plan of care:      Hip and Pelvis.   Activity limitations that impact the plan of care are:      Squatting/kneeling, Standing and Walking.  3) Clinical presentation characteristics are:   Stable/Uncomplicated.  4) Decision-Making    Low complexity using standardized patient assessment instrument and/or measureable assessment of functional outcome.  Cumulative Therapy Evaluation is: Low complexity.    Previous and current functional limitations:  (See Goal Flow Sheet for this information)    Short term and Long term goals: (See Goal Flow Sheet for this information)     Communication ability:  Patient appears to be able to clearly communicate and understand verbal and written communication and follow directions correctly.  Treatment Explanation - The following has been discussed with the patient:   RX ordered/plan of care  Anticipated outcomes  Possible risks and side effects  This patient would benefit from PT intervention to resume normal activities.   Rehab potential is good.    Frequency:  1 X week, once daily  Duration:  for 8 weeks  Discharge Plan:  Achieve all LTG.  Independent in home treatment program.  Return to previous functional level by discharge.  Reach maximal therapeutic benefit.    Please refer to the daily flowsheet for treatment today, total treatment time and time spent performing 1:1 timed codes.

## 2018-09-17 NOTE — PROGRESS NOTES
[facility] Initial Evaluation  Subjective:  Patient is a 63 year old male presenting with rehab left ankle/foot hpi.                                      Pertinent medical history includes:  Overweight, sleep disorder/apnea and smoking.        Current occupation is .    Primary job tasks include:  Prolonged sitting.                                Objective:  System    Physical Exam    General     ROS    Assessment/Plan:

## 2018-09-17 NOTE — LETTER
[facility]  [address] [address] [place] [id number]  [phone]    2018    Re: Adryan Govea   :   1955  MRN:  [id number]   REFERRING PHYSICIAN:   Rodriguez Eric    [facility]  Date of Initial Evaluation:  18  Visits:  Rxs Used: 1  Reason for Referral:  Hip pain, right    EVALUATION SUMMARY    [facility] Initial Evaluation  Subjective:  Patient is a 63 year old male presenting with rehab right hip hpi.   Adryan Govea is a 63 year old male with a right hip condition.  Condition occurred with:  Insidious onset.  Condition occurred: for unknown reasons.  This is a chronic condition  Pt presents to clinic with complaints of posterior R hip pain, gradually worsening over the past 2 months. Pt noticing pain/difficulty with standing and walking, with pain radiating into lateral thigh/leg. Pt had MD appointment on 9/10/18 and referred to PT. .    Patient reports pain:  Lateral and posterior.  Radiates to:  Thigh.  Pain is described as aching and is intermittent and reported as 6/10.  Associated symptoms:  Loss of motion/stiffness, numbness and tingling. Pain is worse in the P.M..  Symptoms are exacerbated by standing, walking, certain positions and bending/squatting and relieved by rest, activity/movement and NSAID's (massage).  Since onset symptoms are gradually worsening.  Special testing: none.  Previous treatment includes chiropractic.  There was no improvement following previous treatment.  General health as reported by patient is good.    Pertinent medical history includes:  Overweight, sleep disorder/apnea and smoking.    Current occupation is .  Primary job tasks include:  Prolonged sitting.    Barriers include:  None as reported by the patient.    Red flags:  None as reported by the patient.           Objective:  Flexibility/Screens:   Lower Extremity:  Decreased left lower extremity flexibility:IT  Band; Quadriceps and Hamstrings  Decreased right lower extremity flexibility:  IT Band; Quadriceps and Hamstrings       Lumbar/SI Evaluation  ROM:    AROM Lumbar:   Flexion:            To mid tibia +hamstring tightness  Ext:                    75%   Side Bend:        Left:  75%    Right:  75%  Rotation:           Left:  75%    Right:  75%   Strength: poor abdominal strength  Lumbar Myotomes:  normal  Lumbar Dermtomes:  normal  Neural Tension/Mobility:    Left side:SLR; SLR w/DF or Slump  negative.   Right side:   Slump positive.  Right side:   SLR w/DF or SLR  negative.   Lumbar Palpation:    Tenderness not present at Left:    Quadratus Lumborum; Erector Spinae or PSIS  Tenderness present at Right: Piriformis  Tenderness not present at Right:  Quadratus Lumborum; Erector Spinae or PSIS         Re: Adryan Govea   :   1955  Hip Evaluation  Hip PROM:    Flexion: Left: 110   Right: 110 deg   Abduction: Left: 35    Right: 35  Internal Rotation: Left: 25    Right: 10  External Rotation: Left: 45    Right: 40  Hip Strength:    Flexion:   Left: 5/5    Right: 5/5             Extension:  Left: 5-/5  Right: 4/5      Abduction:  Left: 5-/5    Right: 4/5        Hip Special Testing:    Left hip negative for the following special tests:  Sabina's   Right hip positive for the following special tests:  Sabina'sRight hip negative for the following special tests:  Lorena or Fadir/Labrum    Hip Palpation:    Right hip tenderness present at:  hip flexors and Piriformis  Functional Testing:    Quad:    Bilateral leg squat:  Mild loss of control and excessive anterior knee excursion     Assessment/Plan:    Patient is a 63 year old male with right side hip complaints.    Patient has the following significant findings with corresponding treatment plan.                Diagnosis 1:  R hip pain  Pain -  hot/cold therapy, manual therapy, self management, education and home program  Decreased ROM/flexibility - manual therapy, therapeutic  exercise, therapeutic activity and home program  Decreased strength - therapeutic exercise, therapeutic activities and home program  Impaired gait - gait training, assistive devices and home program  Impaired muscle performance - neuro re-education and home program  Decreased function - therapeutic activities and home program    Therapy Evaluation Codes:   1) History comprised of:   Personal factors that impact the plan of care:      None.    Comorbidity factors that impact the plan of care are:      Overweight, Sleep disorder/apnea and Smoking.     Medications impacting care: None.  2) Examination of Body Systems comprised of:   Body structures and functions that impact the plan of care:      Hip and Pelvis.   Activity limitations that impact the plan of care are:      Squatting/kneeling, Standing and Walking.  3) Clinical presentation characteristics are:   Stable/Uncomplicated.  4) Decision-Making    Low complexity using standardized patient assessment instrument and/or measureable assessment of functional outcome.  Cumulative Therapy Evaluation is: Low complexity.    Previous and current functional limitations:  (See Goal Flow Sheet for this information)    Short term and Long term goals: (See Goal Flow Sheet for this information)     Communication ability:  Patient appears to be able to clearly communicate and understand verbal and written communication and follow directions correctly.  Treatment Explanation - The following has been discussed with the patient:   RX ordered/plan of care  Anticipated outcomes  Possible risks and side effects  This patient would benefit from PT intervention to resume normal activities.   Rehab potential is good.        Re: Adryan Govea   :   1955    Frequency:  1 X week, once daily  Duration:  for 8 weeks  Discharge Plan:  Achieve all LTG.  Independent in home treatment program.  Return to previous functional level by discharge.  Reach maximal therapeutic  benefit.      Thank you for your referral.    INQUIRIES  Therapist: JANIS Bar [address] [address]  Phone: [phone]  Fax: [phone]

## 2018-09-24 ENCOUNTER — THERAPY VISIT (OUTPATIENT)
Dept: PHYSICAL THERAPY | Facility: CLINIC | Age: 63
End: 2018-09-24
Attending: FAMILY MEDICINE
Payer: COMMERCIAL

## 2018-09-24 DIAGNOSIS — M25.551 HIP PAIN, RIGHT: ICD-10-CM

## 2018-09-24 PROCEDURE — 97140 MANUAL THERAPY 1/> REGIONS: CPT | Mod: GP | Performed by: PHYSICAL THERAPIST

## 2018-09-24 PROCEDURE — 97112 NEUROMUSCULAR REEDUCATION: CPT | Mod: GP | Performed by: PHYSICAL THERAPIST

## 2018-09-24 PROCEDURE — 97110 THERAPEUTIC EXERCISES: CPT | Mod: GP | Performed by: PHYSICAL THERAPIST

## 2018-10-01 ENCOUNTER — THERAPY VISIT (OUTPATIENT)
Dept: PHYSICAL THERAPY | Facility: CLINIC | Age: 63
End: 2018-10-01
Attending: FAMILY MEDICINE
Payer: COMMERCIAL

## 2018-10-01 DIAGNOSIS — M25.551 HIP PAIN, RIGHT: ICD-10-CM

## 2018-10-01 PROCEDURE — 97112 NEUROMUSCULAR REEDUCATION: CPT | Mod: GP | Performed by: PHYSICAL THERAPIST

## 2018-10-01 PROCEDURE — 97110 THERAPEUTIC EXERCISES: CPT | Mod: GP | Performed by: PHYSICAL THERAPIST

## 2018-10-01 PROCEDURE — 97140 MANUAL THERAPY 1/> REGIONS: CPT | Mod: GP | Performed by: PHYSICAL THERAPIST

## 2019-01-02 PROBLEM — M25.551 HIP PAIN, RIGHT: Status: RESOLVED | Noted: 2018-09-17 | Resolved: 2019-01-02

## 2019-01-02 NOTE — PROGRESS NOTES
Subjective:  HPI                    Objective:  System    Physical Exam    General     ROS    Assessment/Plan:    DISCHARGE REPORT    Progress reporting period is from 9/17/18 to 10/1/18.     SUBJECTIVE  Subjective: Was able to walk more last week, with no numbness into R leg. HEP is going well. Will be getting standing work station this week for home. Overall functioning at 75% at this time.    Current Pain level: 2/10   Initial Pain level: 5/10   Changes in function: Yes, see goal flow sheet for change in function   Adverse reactions: None;   ,     Patient has failed to return to therapy so current objective findings are unknown.    OBJECTIVE  Objective: Mild palpable tenderness remains R trochanteric bursa. Cueing to decrease hamstring activation/cramping during single leg bridge      ASSESSMENT/PLAN  Updated problem list and treatment plan: Diagnosis 1:  R hip pain  Pain -  hot/cold therapy, manual therapy, self management, education and home program  Decreased ROM/flexibility - manual therapy, therapeutic exercise, therapeutic activity and home program  Decreased strength - therapeutic exercise, therapeutic activities and home program  Impaired muscle performance - neuro re-education and home program  Decreased function - therapeutic activities and home program  STG/LTGs have been met or progress has been made towards goals:  Yes (See Goal flow sheet completed today.)  Assessment of Progress: The patient's condition is improving.  Self Management Plans:  Patient has been instructed in a home treatment program.  Patient is independent in a home treatment program.  Patient  has been instructed in self management of symptoms.  I have re-evaluated this patient and find that the nature, scope, duration and intensity of the therapy is appropriate for the medical condition of the patient.  Adryan continues to require the following intervention to meet STG and LTG's: PT intervention is no longer required to meet  STG/LTG.  The patient failed to complete scheduled/ordered appointments so current information is unknown.  We will discharge this patient from PT.    Recommendations:  This patient is ready to be discharged from therapy and continue their home treatment program.    Please refer to the daily flowsheet for treatment today, total treatment time and time spent performing 1:1 timed codes.

## 2019-03-05 ENCOUNTER — OFFICE VISIT (OUTPATIENT)
Dept: DERMATOLOGY | Facility: CLINIC | Age: 64
End: 2019-03-05
Payer: COMMERCIAL

## 2019-03-05 VITALS — DIASTOLIC BLOOD PRESSURE: 81 MMHG | HEART RATE: 70 BPM | SYSTOLIC BLOOD PRESSURE: 125 MMHG

## 2019-03-05 DIAGNOSIS — L57.0 ACTINIC KERATOSIS: ICD-10-CM

## 2019-03-05 DIAGNOSIS — B35.1 ONYCHOMYCOSIS: Primary | ICD-10-CM

## 2019-03-05 DIAGNOSIS — Z85.828 HISTORY OF SKIN CANCER: ICD-10-CM

## 2019-03-05 RX ORDER — CICLOPIROX 80 MG/ML
SOLUTION TOPICAL
Qty: 6 ML | Refills: 3 | Status: SHIPPED | OUTPATIENT
Start: 2019-03-05 | End: 2019-05-15

## 2019-03-05 ASSESSMENT — PAIN SCALES - GENERAL
PAINLEVEL: NO PAIN (1)
PAINLEVEL: NO PAIN (0)

## 2019-03-05 NOTE — NURSING NOTE
Dermatology Rooming Note    Adryan Govea's goals for this visit include:   Chief Complaint   Patient presents with     Derm Problem     Adryan is here for a skin check, states no concerns today.        Nora Weeks LPN

## 2019-03-05 NOTE — PATIENT INSTRUCTIONS
Cryotherapy    What is it?    Use of a very cold liquid, such as liquid nitrogen, to freeze and destroy abnormal skin cells that need to be removed    What should I expect?    Tenderness and redness    A small blister that might grow and fill with dark purple blood. There may be crusting.    More than one treatment may be needed if the lesions do not go away.    How do I care for the treated area?    Gently wash the area with your hands when bathing.    Use a thin layer of Vaseline to help with healing. You may use a Band-Aid.     The area should heal within 7-10 days and may leave behind a pink or lighter color.     Do not use an antibiotic or Neosporin ointment.     You may take acetaminophen (Tylenol) for pain.     Call your Doctor if you have:    Severe pain    Signs of infection (warmth, redness, cloudy yellow drainage, and or a bad smell)    Questions or concerns    Who should I call with questions?       [facility]: [phone]       [facility]: [phone]       For urgent needs outside of business hours call the [facility] at [phone]        and ask for the dermatology resident on call

## 2019-03-05 NOTE — LETTER
3/5/2019       RE: Adryan Govea  [address]  [facility] [address]     Dear Colleague,    Thank you for referring your patient, Adryan Govea, to the [facility] at [facility]. Please see a copy of my visit note below.    [facility] Dermatology Note      Dermatology Problem List:  1.Basal cell carcinoma of left posterior shoulder s/p ED&C in 2016  2. History of actinic keratoses of the head and face s/p cryotherapy, cryotherapy 03/05/19  3. Onychomycosis: Prescription for ciclopirox given 03/2019, nail clipping for H&E       Encounter Date: Mar 5, 2019    CC:  Chief Complaint   Patient presents with     Derm Problem     Adryan is here for a skin check, states no concerns today.          History of Present Illness:  Mr. Adryan Govea is a 64 year old male who presents for full skin evaluation. Patient has been seen by dermatology at outside clinics before and would like to establish care here. Patient has history of BCC of the posterior left shoulder that was removed in 2016. He also reports having multiple actinic keratoses frozen off in the past on his face and scalp. He states that he has toenail fungus that he has had for years. He has tried some OTC topical therapies without improvement. He would like to discuss prescription topical therapies today. He is hesitant to use oral therapies due to concern for effect on the liver. No new lesions or areas of concern today.       Past Medical History:   Patient Active Problem List   Diagnosis     Erectile dysfunction     Hyperlipidemia with target LDL less than 100     Obesity     Snoring     Presbyacusis     Complex sleep apnea syndrome     History of colonic polyps     Family history of melanoma     Family history of nonmelanoma skin cancer     History of basal cell carcinoma     Past Medical History:   Diagnosis Date     Basal cell carcinoma      Colon polyps     Sees Dr. Fu.  Has annual colonoscopies     Erectile dysfunction      Hyperlipidemia LDL goal < 100      RD (obstructive sleep apnea)      Past Surgical History:   Procedure Laterality Date     GENITOURINARY SURGERY      vasectomy     ORTHOPEDIC SURGERY      left forearm reconstruction     SOFT TISSUE SURGERY  2016    basal cell shoulder       Social History:  Patient reports that he has been smoking cigars.  he has never used smokeless tobacco. He reports that he drinks alcohol. He reports that he does not use drugs.    Family History:  Family History   Problem Relation Age of Onset     Diabetes Mother      Dementia Mother      Cancer Sister 42        melanoma     Melanoma Sister      Substance Abuse Brother         alcohol       Medications:  Current Outpatient Medications   Medication Sig Dispense Refill     atorvastatin (LIPITOR) 20 MG tablet Take 1 tablet (20 mg) by mouth daily 90 tablet 1     clobetasol (TEMOVATE) 0.05 % cream Apply sparingly to affected area twice daily for 14 days.  Do not apply to face. (Patient not taking: Reported on 9/10/2018) 15 g 0     simvastatin (ZOCOR) 40 MG tablet Take 1 tablet (40 mg) by mouth At Bedtime (Patient not taking: Reported on 9/10/2018) 90 tablet 1     No Known Allergies      Review of Systems:  -Constitutional: Otherwise feeling well today, in usual state of health.  -Skin: As above in HPI. No additional skin concerns.    Physical exam:  Vitals: /81 (BP Location: Left arm, Patient Position: Chair, Cuff Size: Adult Large)   Pulse 70   GEN: This is a well developed, well-nourished male in no acute distress, in a pleasant mood.    SKIN: Total skin excluding the undergarment areas was performed. The exam included the head/face, neck, both arms, chest, back, abdomen, both legs, digits and/or nails.   -erythematous macules with scaling of the right side of face temporal region anterior to ear, 5 areas present  -numerous tan to light brown well demarcated macules scattered over  back, chest, abdomen, and bilateral upper extremities, few on lower extremities  -Light brown papules with rough surface and stuck on appearance, few scattered over the back  -Right foot first, third and fifth toe nail with yellow discoloration and thickening   -No other lesions of concern on areas examined.     Impression/Plan:  1. Onychomycosis of the right foot toenails: Patient presentation consistent with onychomycosis. Nail clipping completed today for confirmatory testing. Information was provided regarding oral and topical treatments. Patient is not interested in oral therapy but would like to try topical therapy. Patient was informed of the low success rates of these therapies and was understanding of this information. Print out information was given regarding topical therapies and prescription sent for therapy patient would like to try.     H&E nail clipping completed today    Prescription for ciclopirox external solution, to be applied to nails daily for 48 weeks.     2. Actinic keratosis of the right side of the face, anterior to ear    Cryotherapy procedure note (performed by faculty with medical student participation): After verbal consent and discussion of risks and benefits including but no limited to dyspigmentation/scar, blister, infection, recurrence, 5 were treated with 1-2mm freeze border for 2 cycles with liquid nitrogen. Post cryotherapy instructions were provided.     3. History of nonmelanoma skin cancer of left posterior shoulder, no clincial evidence of recurrence    Continue to monitor.    4. Seborrheic keratosis, non irritated    Benign nature was discussed.No further intervention required at this time.       CC Rodriguez Eric MD  [address] [facility] [address]  [place] [address] on close of this encounter.  Follow-up in 1 year, earlier for new or changing lesions.     Staff Involved:  I, Essie Marrufo, MS4, saw and examined the patient in the presence of   Tyrese.    Staff Physician:  I was present with the medical student who participated in the service and in the documentation of the note. I have verified the history and personally performed the physical exam and medical decision making. I agree with the assessment and plan of care as documented in the note.  The cryotherapy procedure was done together as noted above.    Gail Xiong MD  Professor and Chair  Department of Dermatology  [facility]: Phone: [phone], Fax:[phone]  [facility]: Phone: [phone], Fax: [phone]

## 2019-03-05 NOTE — PROGRESS NOTES
[facility] Dermatology Note      Dermatology Problem List:  1.Basal cell carcinoma of left posterior shoulder s/p ED&C in 2016  2. History of actinic keratoses of the head and face s/p cryotherapy, cryotherapy 03/05/19  3. Onychomycosis: Prescription for ciclopirox given 03/2019, nail clipping for H&E       Encounter Date: Mar 5, 2019    CC:  Chief Complaint   Patient presents with     Derm Problem     Adryan is here for a skin check, states no concerns today.          History of Present Illness:  Mr. Adryan Govea is a 64 year old male who presents for full skin evaluation. Patient has been seen by dermatology at outside clinics before and would like to establish care here. Patient has history of BCC of the posterior left shoulder that was removed in 2016. He also reports having multiple actinic keratoses frozen off in the past on his face and scalp. He states that he has toenail fungus that he has had for years. He has tried some OTC topical therapies without improvement. He would like to discuss prescription topical therapies today. He is hesitant to use oral therapies due to concern for effect on the liver. No new lesions or areas of concern today.       Past Medical History:   Patient Active Problem List   Diagnosis     Erectile dysfunction     Hyperlipidemia with target LDL less than 100     Obesity     Snoring     Presbyacusis     Complex sleep apnea syndrome     History of colonic polyps     Family history of melanoma     Family history of nonmelanoma skin cancer     History of basal cell carcinoma     Past Medical History:   Diagnosis Date     Basal cell carcinoma      Colon polyps     Sees Dr. Fu. Has annual colonoscopies     Erectile dysfunction      Hyperlipidemia LDL goal < 100      RD (obstructive sleep apnea)      Past Surgical History:   Procedure Laterality Date     GENITOURINARY SURGERY      vasectomy     ORTHOPEDIC SURGERY      left forearm reconstruction     SOFT TISSUE  SURGERY  2016    basal cell shoulder       Social History:  Patient reports that he has been smoking cigars.  he has never used smokeless tobacco. He reports that he drinks alcohol. He reports that he does not use drugs.    Family History:  Family History   Problem Relation Age of Onset     Diabetes Mother      Dementia Mother      Cancer Sister 42        melanoma     Melanoma Sister      Substance Abuse Brother         alcohol       Medications:  Current Outpatient Medications   Medication Sig Dispense Refill     atorvastatin (LIPITOR) 20 MG tablet Take 1 tablet (20 mg) by mouth daily 90 tablet 1     clobetasol (TEMOVATE) 0.05 % cream Apply sparingly to affected area twice daily for 14 days.  Do not apply to face. (Patient not taking: Reported on 9/10/2018) 15 g 0     simvastatin (ZOCOR) 40 MG tablet Take 1 tablet (40 mg) by mouth At Bedtime (Patient not taking: Reported on 9/10/2018) 90 tablet 1     No Known Allergies      Review of Systems:  -Constitutional: Otherwise feeling well today, in usual state of health.  -Skin: As above in HPI. No additional skin concerns.    Physical exam:  Vitals: /81 (BP Location: Left arm, Patient Position: Chair, Cuff Size: Adult Large)   Pulse 70   GEN: This is a well developed, well-nourished male in no acute distress, in a pleasant mood.    SKIN: Total skin excluding the undergarment areas was performed. The exam included the head/face, neck, both arms, chest, back, abdomen, both legs, digits and/or nails.   -erythematous macules with scaling of the right side of face temporal region anterior to ear, 5 areas present  -numerous tan to light brown well demarcated macules scattered over back, chest, abdomen, and bilateral upper extremities, few on lower extremities  -Light brown papules with rough surface and stuck on appearance, few scattered over the back  -Right foot first, third and fifth toe nail with yellow discoloration and thickening   -No other lesions of concern on  areas examined.     Impression/Plan:  1. Onychomycosis of the right foot toenails: Patient presentation consistent with onychomycosis. Nail clipping completed today for confirmatory testing. Information was provided regarding oral and topical treatments. Patient is not interested in oral therapy but would like to try topical therapy. Patient was informed of the low success rates of these therapies and was understanding of this information. Print out information was given regarding topical therapies and prescription sent for therapy patient would like to try.     H&E nail clipping completed today    Prescription for ciclopirox external solution, to be applied to nails daily for 48 weeks.     2. Actinic keratosis of the right side of the face, anterior to ear    Cryotherapy procedure note (performed by faculty with medical student participation): After verbal consent and discussion of risks and benefits including but no limited to dyspigmentation/scar, blister, infection, recurrence, 5 were treated with 1-2mm freeze border for 2 cycles with liquid nitrogen. Post cryotherapy instructions were provided.     3. History of nonmelanoma skin cancer of left posterior shoulder, no clincial evidence of recurrence    Continue to monitor.    4. Seborrheic keratosis, non irritated    Benign nature was discussed.No further intervention required at this time.       CC Rodriguez Eric MD  [address] [facility] [address]  [place] [address] on close of this encounter.  Follow-up in 1 year, earlier for new or changing lesions.     Staff Involved:  I, Essie Marrufo, MS4, saw and examined the patient in the presence of Dr. Xiong.    Staff Physician:  I was present with the medical student who participated in the service and in the documentation of the note. I have verified the history and personally performed the physical exam and medical decision making. I agree with the assessment and plan of care as documented in the  note.  The cryotherapy procedure was done together as noted above.    Gail Xiong MD  Professor and Chair  Department of Dermatology  [facility]: Phone: [phone], Fax:[phone]  [facility]: Phone: [phone], Fax: [phone]

## 2019-03-07 LAB — COPATH REPORT: NORMAL

## 2019-05-15 ENCOUNTER — OFFICE VISIT (OUTPATIENT)
Dept: FAMILY MEDICINE | Facility: CLINIC | Age: 64
End: 2019-05-15
Payer: COMMERCIAL

## 2019-05-15 VITALS
DIASTOLIC BLOOD PRESSURE: 88 MMHG | WEIGHT: 291 LBS | BODY MASS INDEX: 40.36 KG/M2 | HEART RATE: 73 BPM | TEMPERATURE: 98.3 F | SYSTOLIC BLOOD PRESSURE: 135 MMHG | OXYGEN SATURATION: 96 %

## 2019-05-15 DIAGNOSIS — Z28.39 INCOMPLETE IMMUNIZATION STATUS: Primary | ICD-10-CM

## 2019-05-15 DIAGNOSIS — B35.1 ONYCHOMYCOSIS: ICD-10-CM

## 2019-05-15 DIAGNOSIS — E78.5 HYPERLIPIDEMIA WITH TARGET LDL LESS THAN 100: ICD-10-CM

## 2019-05-15 RX ORDER — CICLOPIROX 80 MG/ML
SOLUTION TOPICAL
Qty: 6 ML | Refills: 3 | Status: SHIPPED | OUTPATIENT
Start: 2019-05-15 | End: 2019-07-29

## 2019-05-15 NOTE — PROGRESS NOTES
Adryan Govea is a 64 year old male who presents to [facility] today to request assessment for Measles immunity. He and his wife are new grandparents. There is a measles outbreak and his wife works where some have had measles.    Only other issue is request for Cicloprox for nail fungus. This was prescribed by Dermatology. Adryan feels that it is helping    Review Of Systems:  Has otherwise been in usual state of health, e.g.   Cardiovascular: negative  Respiratory: No shortness of breath, dyspnea on exertion, cough, or hemoptysis  Gastrointestinal: negative  Genitourinary: negative    Problem list per EMR:  Patient Active Problem List   Diagnosis     Erectile dysfunction     Hyperlipidemia with target LDL less than 100     Obesity     Snoring     Presbyacusis     Complex sleep apnea syndrome     History of colonic polyps     Family history of melanoma     Family history of nonmelanoma skin cancer     History of basal cell carcinoma       Current Outpatient Medications   Medication Sig Dispense Refill     atorvastatin (LIPITOR) 20 MG tablet Take 1 tablet (20 mg) by mouth daily 90 tablet 1     ciclopirox (PENLAC) 8 % external solution Apply to  affected nails daily.  Remove with alcohol every 7 days, then repeat. 6 mL 3     clobetasol (TEMOVATE) 0.05 % cream Apply sparingly to affected area twice daily for 14 days.  Do not apply to face. (Patient not taking: Reported on 9/10/2018) 15 g 0     simvastatin (ZOCOR) 40 MG tablet Take 1 tablet (40 mg) by mouth At Bedtime (Patient not taking: Reported on 9/10/2018) 90 tablet 1       No Known Allergies       EXAM    Vitals: /88 (BP Location: Right arm, Patient Position: Sitting, Cuff Size: Adult Regular)   Pulse 73   Temp 98.3  F (36.8  C) (Oral)   Wt 132 kg (291 lb)   SpO2 96%   BMI 40.36 kg/m    BMI= Body mass index is 40.36 kg/m .  Appears well.   RIGHT foot with onychomycotic appearance to toenails, especially #5      ASSESSMENT/PLAN:    1.  Onychomycosis    - ciclopirox (PENLAC) 8 % external solution; Apply to  affected nails daily.  Remove with alcohol every 7 days, then repeat.  Dispense: 6 mL; Refill: 3    2. Unknown immunization status  Check - Rubeola Antibody IgG    3. Hyperlipidemia with target LDL less than 100  FUTURE labs  - Lipid Panel ([place]); Future  - Comprehensive Metabolic Panel ([place]); Future  Follow up after labs to discuss obesity and hyperlipidemia        --Rodriguez Eric MD  [facility], Department of Family Medicine and Community Health

## 2019-05-15 NOTE — NURSING NOTE
64 year old  Chief Complaint   Patient presents with     Results     confirm immunity for measles      Refill Request     refill for ciclopirox       Blood pressure 135/88, pulse 73, temperature 98.3  F (36.8  C), temperature source Oral, weight 132 kg (291 lb), SpO2 96 %. Body mass index is 40.36 kg/m .  Patient Active Problem List   Diagnosis     Erectile dysfunction     Hyperlipidemia with target LDL less than 100     Obesity     Snoring     Presbyacusis     Complex sleep apnea syndrome     History of colonic polyps     Family history of melanoma     Family history of nonmelanoma skin cancer     History of basal cell carcinoma       Wt Readings from Last 2 Encounters:   05/15/19 132 kg (291 lb)   09/10/18 128.4 kg (283 lb 0.6 oz)     BP Readings from Last 3 Encounters:   05/15/19 135/88   03/05/19 125/81   09/10/18 136/87         Current Outpatient Medications   Medication     atorvastatin (LIPITOR) 20 MG tablet     ciclopirox (PENLAC) 8 % external solution     clobetasol (TEMOVATE) 0.05 % cream     simvastatin (ZOCOR) 40 MG tablet     No current facility-administered medications for this visit.        Social History     Tobacco Use     Smoking status: Current Some Day Smoker     Types: Cigars     Smokeless tobacco: Never Used   Substance Use Topics     Alcohol use: Yes     Drug use: No       Health Maintenance Due   Topic Date Due     ZOSTER IMMUNIZATION (1 of 2) 01/25/2005     ADVANCE DIRECTIVE PLANNING Q5 YRS  01/25/2010     PHQ-2  01/01/2019       No results found for: PAP      May 15, 2019 1:35 PM

## 2019-05-16 ENCOUNTER — OFFICE VISIT (OUTPATIENT)
Dept: OPHTHALMOLOGY | Facility: CLINIC | Age: 64
End: 2019-05-16
Attending: OPHTHALMOLOGY
Payer: COMMERCIAL

## 2019-05-16 DIAGNOSIS — H04.123 DRY EYE SYNDROME OF BOTH EYES: ICD-10-CM

## 2019-05-16 DIAGNOSIS — H25.13 AGE-RELATED NUCLEAR CATARACT OF BOTH EYES: ICD-10-CM

## 2019-05-16 DIAGNOSIS — H52.4 HYPEROPIA WITH PRESBYOPIA OF BOTH EYES: Primary | ICD-10-CM

## 2019-05-16 DIAGNOSIS — H52.03 HYPEROPIA WITH PRESBYOPIA OF BOTH EYES: Primary | ICD-10-CM

## 2019-05-16 LAB — MEV IGG SER QL IA: 6.7 AI (ref 0–0.8)

## 2019-05-16 PROCEDURE — G0463 HOSPITAL OUTPT CLINIC VISIT: HCPCS | Mod: ZF

## 2019-05-16 PROCEDURE — 92015 DETERMINE REFRACTIVE STATE: CPT | Mod: ZF

## 2019-05-16 ASSESSMENT — VISUAL ACUITY
CORRECTION_TYPE: GLASSES
METHOD: SNELLEN - LINEAR
OS_CC: J1
OD_CC+: -1
OD_CC: 20/20
OD_CC: J1
OS_CC: 20/20

## 2019-05-16 ASSESSMENT — REFRACTION_MANIFEST
OD_AXIS: 160
OD_ADD: +2.50
OS_SPHERE: +1.25
OD_CYLINDER: +0.25
OD_SPHERE: +1.50
OS_ADD: +2.50
OS_CYLINDER: SPHERE

## 2019-05-16 ASSESSMENT — CONF VISUAL FIELD
OD_NORMAL: 1
OS_NORMAL: 1
METHOD: COUNTING FINGERS

## 2019-05-16 ASSESSMENT — EXTERNAL EXAM - LEFT EYE: OS_EXAM: NORMAL

## 2019-05-16 ASSESSMENT — EXTERNAL EXAM - RIGHT EYE: OD_EXAM: NORMAL

## 2019-05-16 ASSESSMENT — REFRACTION_WEARINGRX
OD_AXIS: 148
OD_CYLINDER: +1.00
OS_SPHERE: +1.50
OS_CYLINDER: SPHERE
SPECS_TYPE: PAL
OD_SPHERE: +1.25
OS_ADD: +2.50
OD_ADD: +2.50

## 2019-05-16 ASSESSMENT — CUP TO DISC RATIO
OD_RATIO: 0.3
OS_RATIO: 0.3

## 2019-05-16 ASSESSMENT — TONOMETRY
OS_IOP_MMHG: 19
OD_IOP_MMHG: 19
IOP_METHOD: TONOPEN

## 2019-05-16 NOTE — NURSING NOTE
Chief Complaint(s) and History of Present Illness(es)     Annual Eye Exam     In both eyes.  Associated symptoms include Negative for dryness, eye pain, redness and tearing.              Comments     Pt is here for his annual eye exam. Pt states he needs new sunglasses and would like a new rx in order to get those. Overall vision is good with current glasses. No changes.     Vanda Harris, MISTI 9:16 AM May 16, 2019

## 2019-05-16 NOTE — PROGRESS NOTES
HPI     Annual Eye Exam     In both eyes.  Associated symptoms include Negative for dryness, eye pain, redness and tearing.              Comments     Pt is here for his annual eye exam. Pt states he needs new sunglasses and would like a new rx in order to get those. Overall vision is good with current glasses. No changes. Notes intermittent floaters without acute changes. Denies eye pain, discomfort, tearing, new floaters or flashes of light.    Vanda Harris, COMT 9:16 AM May 16, 2019             Last edited by Sarabjit Julian MD on 5/16/2019  9:56 AM. (History)        PMHx: RD, HLD, hx BCC  POHx: Spectacle use. No trauma or ocular surgeries  FHx: Negative for ocular conditions    Assessment & Plan      Adryan Govea is a 64 year old male with the following diagnoses:   1. Hyperopia with presbyopia of both eyes    2. Age-related nuclear cataract of both eyes    3. Dry eye syndrome of both eyes       Hyperopia with presbyopia  Updated Rx provided per patient request    Mild cataracts, both eyes  Not visually significant at this time  Monitor for now    Dry eyes, mild  Start artificial tears PRN      Patient disposition:   Return in about 1 year (around 5/16/2020) for Annual Visit.     Sarabjit Julian MD  Ophthalmology Resident, PGY-2    Attending Physician Attestation:  Complete documentation of historical and exam elements from today's encounter can be found in the full encounter summary report (not reduplicated in this progress note).  I personally obtained the chief complaint(s) and history of present illness.  I confirmed and edited as necessary the review of systems, past medical/surgical history, family history, social history, and examination findings as documented by others; and I examined the patient myself.  I personally reviewed the relevant tests, images, and reports as documented above.  I formulated and edited as necessary the assessment and plan and discussed the findings and management plan  with the patient and family. . - Judd Monreal MD

## 2019-07-29 ENCOUNTER — OFFICE VISIT (OUTPATIENT)
Dept: DERMATOLOGY | Facility: CLINIC | Age: 64
End: 2019-07-29
Payer: COMMERCIAL

## 2019-07-29 DIAGNOSIS — B35.1 ONYCHOMYCOSIS: Primary | ICD-10-CM

## 2019-07-29 DIAGNOSIS — L82.0 SEBORRHEIC KERATOSES, INFLAMED: ICD-10-CM

## 2019-07-29 RX ORDER — UREA 200 MG/G
CREAM TOPICAL
Qty: 120 G | Refills: 11 | Status: SHIPPED | OUTPATIENT
Start: 2019-07-29 | End: 2022-01-10

## 2019-07-29 RX ORDER — CICLOPIROX 80 MG/ML
SOLUTION TOPICAL
Qty: 6 ML | Refills: 11 | Status: SHIPPED | OUTPATIENT
Start: 2019-07-29 | End: 2020-08-05

## 2019-07-29 ASSESSMENT — PAIN SCALES - GENERAL: PAINLEVEL: NO PAIN (0)

## 2019-07-29 NOTE — PATIENT INSTRUCTIONS
"Information on protecting and monitoring your skin    It is important to keep your skin protected from the sun. The best sun protection is avoiding the sun from getting to your skin by seeking shade, staying out of the sun during peak hours (10 AM-2 PM), wearing long sleeves or sun-protective clothing (such as \"Coolibar,\" a [place]-based company).     If these measures are not feasible, or for other sun-exposed areas (like the face), it is important to wear wear sunscreen SPF 30+ that covers both UVA and UVB. We prefer physical block sunscreens with zinc oxide or titanium dioxide as the active ingredients. Examples include Neutrogena Sensitive Skin, Elta MD (available on [employer] or at [employer]) or [employer] sunscreens.     In addition, watch for signs of worrisome moles and check all your skin regularly. Monthly is recommended.     This includes:  A - asymmetry: not the same on the both sides  B - border: irregular borders, jagged or bumpy borders  C - color: very black or multi colored moles  D - diameter: anything bigger than a pencil eraser  E - evolution: anything that is changing    Moles should not be tender, bleed, or itch.     It is important to let us know if you have any of these concerning features right away.     Avoid tanning bed use as this raises the risk of developing skin cancer.    Cryotherapy    What is it?    Use of a very cold liquid, such as liquid nitrogen, to freeze and destroy abnormal skin cells that need to be removed    What should I expect?    Tenderness and redness    A small blister that might grow and fill with dark purple blood. There may be crusting.    More than one treatment may be needed if the lesions do not go away.    How do I care for the treated area?    Gently wash the area with your hands when bathing.    Use a thin layer of Vaseline to help with healing. You may use a Band-Aid.     The area should heal within 7-10 days and may leave behind a pink or lighter " color.     Do not use an antibiotic or Neosporin ointment.     You may take acetaminophen (Tylenol) for pain.     Call your Doctor if you have:    Severe pain    Signs of infection (warmth, redness, cloudy yellow drainage, and or a bad smell)    Questions or concerns    Who should I call with questions?       [facility]: [phone]       [facility]: [phone]       For urgent needs outside of business hours call the [facility] at [phone]        and ask for the dermatology resident on call

## 2019-07-29 NOTE — NURSING NOTE
Dermatology Rooming Note    Adryan Govea's goals for this visit include:   Chief Complaint   Patient presents with     Derm Problem     Adryan is here today for a spot check and a toenail fungus follow up.      DANNY Muller

## 2019-07-29 NOTE — LETTER
7/29/2019       RE: Adryan Govea  [address]  [place] [address]     Dear Colleague,    Thank you for referring your patient, Adryan Govea, to the [facility] DERMATOLOGY at [facility]. Please see a copy of my visit note below.    [facility] Dermatology Note      Dermatology Problem List:    Last TBSE: 3/5/2019  1.Basal cell carcinoma of left posterior shoulder s/p ED&C in 2016  2. History of actinic keratoses of the head and face s/p cryotherapy, cryotherapy 03/05/19  3. Onychomycosis of right foot toenails: Prescription for ciclopirox lacquer daily given 03/2019, nail clipping for H&E demonstrating onychomycosis; urea 20% cream qAM  4. Irritated seborrheic keratosis of the right lateral cheek s/p LN2 on 7/29/2019    Encounter Date: Jul 29, 2019    CC:   Chief Complaint   Patient presents with     Derm Problem     Adryan is here today for a spot check and a toenail fungus follow up.      History of Present Illness:  Mr. Adryan Govea is a 64 year old male who presents as a follow-up for onychomycosis of the right foot toenails. The patient was last seen on March 5, 2019, when he was diagnosed with onychomycosis of the right foot toenails.  This was confirmed on H&E nail clipping.  The patient was prescribed ciclopirox external solution applied to the nails daily as he preferred topical therapy.  The patient says that he has been doing this since the last appointment and has noted some areas of clearance of the proximal nail bed.  He denies any new involvement.  He denies any rash on the body.  He says that at the right lateral cheek, he has had a raised tan-brown stuck on bump that sometimes becomes irritated with shaving. Onset about 1 year ago. He denies any itching, burning, bleeding or tingling.  Of note, he does have a history of a basal cell carcinoma of the left posterior shoulder treated with ED&C in 2016.  He says that he does try to  avoid direct UV radiation and wear protective clothing and hats.  Otherwise, he denies any other concerning spots or rashes, or any constitutional symptoms.    Past Medical History:   Patient Active Problem List   Diagnosis     Erectile dysfunction     Hyperlipidemia with target LDL less than 100     Obesity     Snoring     Presbyacusis     Complex sleep apnea syndrome     History of colonic polyps     Family history of melanoma     Family history of nonmelanoma skin cancer     History of basal cell carcinoma     Past Medical History:   Diagnosis Date     Basal cell carcinoma      Colon polyps     Sees Dr. Fu. Has annual colonoscopies     Erectile dysfunction      Hyperlipidemia LDL goal < 100      RD (obstructive sleep apnea)      Past Surgical History:   Procedure Laterality Date     GENITOURINARY SURGERY      vasectomy     ORTHOPEDIC SURGERY      left forearm reconstruction     SOFT TISSUE SURGERY  2016    basal cell shoulder       Social History:  Patient reports that he has been smoking cigars.  He has never used smokeless tobacco. He reports that he drinks alcohol. He reports that he does not use drugs.    Family History:  Family History   Problem Relation Age of Onset     Diabetes Mother      Dementia Mother      Cancer Sister 42        melanoma     Melanoma Sister      Substance Abuse Brother         alcohol       Medications:  Current Outpatient Medications   Medication Sig Dispense Refill     atorvastatin (LIPITOR) 20 MG tablet Take 1 tablet (20 mg) by mouth daily 90 tablet 1     ciclopirox (PENLAC) 8 % external solution Apply to  affected nails daily.  Remove with alcohol every 7 days, then repeat. 6 mL 3        No Known Allergies      Review of Systems:  -Skin Establ Pt: The patient denies any new rash, pruritus, or lesions that are symptomatic, changing or bleeding, except as per HPI.  -Constitutional: Otherwise feeling well today, in usual state of health.  -HEENT: Patient denies nonhealing oral  sores.  -Skin: As above in HPI. No additional skin concerns.    Physical exam:  GEN: This is a well developed, well-nourished male in no acute distress, in a pleasant mood.    SKIN: Sun-exposed skin, which includes the head/face, neck, both arms, digits, and/or nails was examined.   - The right foot first, third and fifth toenails are with nail thickening and some yellow discoloration, with relative clearance of the proximal nail beds.  No involvement of the rest of the feet, fingernails or hands.  - At the right lateral cheek, there is a roughly 1 cm tan-brown stuck on papule without concerning findings on dermatoscopy that appears irritated clinically.  -There are fine lines and dyspigmentation on sun exposed areas of the face and chest.  -There are yellow oily papules with central umbilication located on the face.  -No other lesions of concern on areas examined.     Impression/Plan:  1. Onychomycosis of the right foot toenails, confirmed on PAS nail clipping    The patient is having improvement using ciclopirox solution daily.  It is reasonable to continue with this line of therapy, along with using urea 20% cream in the morning.    Use ciclopirox solution nightly    Use urea 20% cream daily and could increase to 30% or 40% in future, if tolerated    Return to clinic in 6 months    2. Seborrheic keratosis of the right lateral cheek, irritated    The benign nature was reviewed with the patient and reassurance provided.  Given that it is symptomatic, it is reasonable to treat it with cryotherapy.      Cryotherapy procedure note: After verbal consent and discussion of risks and benefits including but no limited to dyspigmentation/scar, blister, and pain, 1 lesion was(were) treated with 1-2mm freeze border for 2 cycles with liquid nitrogen. Post cryotherapy instructions were provided.    CC Referred MD Papito  No address on file on close of this encounter.  Follow-up in 6 months, earlier for new or changing lesions.      Dr. Xiong staffed the patient.    Staff Involved:  Resident(Dr. Tavares Neri)/Staff    Patient was seen and examined with the dermatology resident. I agree with the history, review of systems, physical examination, assessments and plan. I was present for the entire cryotherapy procedure.    Gail Xiong MD  Professor and  Chair  Department of Dermatology  [facility]

## 2019-07-29 NOTE — PROGRESS NOTES
[facility] Dermatology Note      Dermatology Problem List:    Last TBSE: 3/5/2019  1.Basal cell carcinoma of left posterior shoulder s/p ED&C in 2016  2. History of actinic keratoses of the head and face s/p cryotherapy, cryotherapy 03/05/19  3. Onychomycosis of right foot toenails: Prescription for ciclopirox lacquer daily given 03/2019, nail clipping for H&E demonstrating onychomycosis; urea 20% cream qAM  4. Irritated seborrheic keratosis of the right lateral cheek s/p LN2 on 7/29/2019    Encounter Date: Jul 29, 2019    CC:   Chief Complaint   Patient presents with     Derm Problem     Adryan is here today for a spot check and a toenail fungus follow up.      History of Present Illness:  Mr. Adryan Govea is a 64 year old male who presents as a follow-up for onychomycosis of the right foot toenails. The patient was last seen on March 5, 2019, when he was diagnosed with onychomycosis of the right foot toenails.  This was confirmed on H&E nail clipping.  The patient was prescribed ciclopirox external solution applied to the nails daily as he preferred topical therapy.  The patient says that he has been doing this since the last appointment and has noted some areas of clearance of the proximal nail bed.  He denies any new involvement.  He denies any rash on the body.  He says that at the right lateral cheek, he has had a raised tan-brown stuck on bump that sometimes becomes irritated with shaving. Onset about 1 year ago. He denies any itching, burning, bleeding or tingling.  Of note, he does have a history of a basal cell carcinoma of the left posterior shoulder treated with ED&C in 2016.  He says that he does try to avoid direct UV radiation and wear protective clothing and hats.  Otherwise, he denies any other concerning spots or rashes, or any constitutional symptoms.    Past Medical History:   Patient Active Problem List   Diagnosis     Erectile dysfunction     Hyperlipidemia with target LDL  less than 100     Obesity     Snoring     Presbyacusis     Complex sleep apnea syndrome     History of colonic polyps     Family history of melanoma     Family history of nonmelanoma skin cancer     History of basal cell carcinoma     Past Medical History:   Diagnosis Date     Basal cell carcinoma      Colon polyps     Sees Dr. Fu. Has annual colonoscopies     Erectile dysfunction      Hyperlipidemia LDL goal < 100      RD (obstructive sleep apnea)      Past Surgical History:   Procedure Laterality Date     GENITOURINARY SURGERY      vasectomy     ORTHOPEDIC SURGERY      left forearm reconstruction     SOFT TISSUE SURGERY  2016    basal cell shoulder       Social History:  Patient reports that he has been smoking cigars.  He has never used smokeless tobacco. He reports that he drinks alcohol. He reports that he does not use drugs.    Family History:  Family History   Problem Relation Age of Onset     Diabetes Mother      Dementia Mother      Cancer Sister 42        melanoma     Melanoma Sister      Substance Abuse Brother         alcohol       Medications:  Current Outpatient Medications   Medication Sig Dispense Refill     atorvastatin (LIPITOR) 20 MG tablet Take 1 tablet (20 mg) by mouth daily 90 tablet 1     ciclopirox (PENLAC) 8 % external solution Apply to  affected nails daily.  Remove with alcohol every 7 days, then repeat. 6 mL 3        No Known Allergies      Review of Systems:  -Skin Establ Pt: The patient denies any new rash, pruritus, or lesions that are symptomatic, changing or bleeding, except as per HPI.  -Constitutional: Otherwise feeling well today, in usual state of health.  -HEENT: Patient denies nonhealing oral sores.  -Skin: As above in HPI. No additional skin concerns.    Physical exam:  GEN: This is a well developed, well-nourished male in no acute distress, in a pleasant mood.    SKIN: Sun-exposed skin, which includes the head/face, neck, both arms, digits, and/or nails was examined.   -  The right foot first, third and fifth toenails are with nail thickening and some yellow discoloration, with relative clearance of the proximal nail beds.  No involvement of the rest of the feet, fingernails or hands.  - At the right lateral cheek, there is a roughly 1 cm tan-brown stuck on papule without concerning findings on dermatoscopy that appears irritated clinically.  -There are fine lines and dyspigmentation on sun exposed areas of the face and chest.  -There are yellow oily papules with central umbilication located on the face.  -No other lesions of concern on areas examined.     Impression/Plan:  1. Onychomycosis of the right foot toenails, confirmed on PAS nail clipping    The patient is having improvement using ciclopirox solution daily.  It is reasonable to continue with this line of therapy, along with using urea 20% cream in the morning.    Use ciclopirox solution nightly    Use urea 20% cream daily and could increase to 30% or 40% in future, if tolerated    Return to clinic in 6 months    2. Seborrheic keratosis of the right lateral cheek, irritated    The benign nature was reviewed with the patient and reassurance provided.  Given that it is symptomatic, it is reasonable to treat it with cryotherapy.      Cryotherapy procedure note: After verbal consent and discussion of risks and benefits including but no limited to dyspigmentation/scar, blister, and pain, 1 lesion was(were) treated with 1-2mm freeze border for 2 cycles with liquid nitrogen. Post cryotherapy instructions were provided.    CC Referred MD Papito  No address on file on close of this encounter.  Follow-up in 6 months, earlier for new or changing lesions.     Dr. Xiong staffed the patient.    Staff Involved:  Resident(Dr. Tavares Neri)/Staff    Patient was seen and examined with the dermatology resident. I agree with the history, review of systems, physical examination, assessments and plan. I was present for the entire cryotherapy  procedure.    Gail Xiong MD  Professor and  Chair  Department of Dermatology  [facility]

## 2019-09-25 ENCOUNTER — TRANSFERRED RECORDS (OUTPATIENT)
Dept: HEALTH INFORMATION MANAGEMENT | Facility: CLINIC | Age: 64
End: 2019-09-25

## 2019-09-29 ENCOUNTER — HEALTH MAINTENANCE LETTER (OUTPATIENT)
Age: 64
End: 2019-09-29

## 2019-10-28 ENCOUNTER — HEALTH MAINTENANCE LETTER (OUTPATIENT)
Age: 64
End: 2019-10-28

## 2020-01-23 DIAGNOSIS — G47.33 OSA (OBSTRUCTIVE SLEEP APNEA): Primary | ICD-10-CM

## 2020-02-11 ENCOUNTER — TRANSFERRED RECORDS (OUTPATIENT)
Dept: HEALTH INFORMATION MANAGEMENT | Facility: CLINIC | Age: 65
End: 2020-02-11

## 2020-02-19 ENCOUNTER — TELEPHONE (OUTPATIENT)
Dept: SLEEP MEDICINE | Facility: CLINIC | Age: 65
End: 2020-02-19

## 2020-02-19 NOTE — TELEPHONE ENCOUNTER
"Pt is trying to order supplies for CPAP machine. We do not have a his sleep study interpretation on filed.  Tried looking for doc in Epic, but only found sleep study data and insurance requires an interpretation.  Sent a request to [facility] and was sent sleep study with 12/16/2013 follow up note that include \"sleep study main result\",. Verified this does not qualify as a interp.  If we do not have the interp, pt will have to be re-studied. Pt was not happy with this news and said he will reach out to his sleep doctor.     Pt called back a few minutes after and stated he usually scans and saves all his documents and may have a copy of the interpretation. Said he will call once he looks for it.   "

## 2020-02-20 ENCOUNTER — TELEPHONE (OUTPATIENT)
Dept: SLEEP MEDICINE | Facility: CLINIC | Age: 65
End: 2020-02-20

## 2020-02-20 NOTE — TELEPHONE ENCOUNTER
Pt called back. Explained they will need to get re-studied. Pt understood and told him to contact Dr. Benton's clinic.

## 2020-03-15 ENCOUNTER — HEALTH MAINTENANCE LETTER (OUTPATIENT)
Age: 65
End: 2020-03-15

## 2020-05-27 DIAGNOSIS — E78.5 HYPERLIPIDEMIA WITH TARGET LDL LESS THAN 100: Primary | ICD-10-CM

## 2020-05-27 NOTE — PROGRESS NOTES
When I look at my last note it was May 15, 2019. I ordered Lipids and a Comp panel as Future orders without an end date for Adryan Govea. Maybe because it is over 1 year, they .   If needed for a new order, then Yes, can you please reorder those tests and connect with Adryan Govea (and his wife, Cassandra?).   Thanks,   --Dl

## 2020-07-06 DIAGNOSIS — E78.5 HYPERLIPIDEMIA WITH TARGET LDL LESS THAN 100: ICD-10-CM

## 2020-07-06 LAB
CHOLEST SERPL-MCNC: 199 MG/DL (ref 0–200)
CHOLEST/HDLC SERPL: 3.8 {RATIO} (ref 0–5)
FASTING SPECIMEN: YES
HDLC SERPL-MCNC: 53 MG/DL
LDLC SERPL CALC-MCNC: 100 MG/DL (ref 0–129)
TRIGL SERPL-MCNC: 233 MG/DL (ref 0–150)
VLDL-CHOLESTEROL: 47 (ref 7–32)

## 2020-07-07 LAB
ALBUMIN SERPL-MCNC: 4 G/DL (ref 3.4–5)
ALP SERPL-CCNC: 72 U/L (ref 40–150)
ALT SERPL W P-5'-P-CCNC: 78 U/L (ref 0–70)
ANION GAP SERPL CALCULATED.3IONS-SCNC: 13 MMOL/L (ref 3–14)
AST SERPL W P-5'-P-CCNC: 44 U/L (ref 0–45)
BILIRUB SERPL-MCNC: 1 MG/DL (ref 0.2–1.3)
BUN SERPL-MCNC: 11 MG/DL (ref 7–30)
CALCIUM SERPL-MCNC: 9.1 MG/DL (ref 8.5–10.1)
CHLORIDE SERPL-SCNC: 106 MMOL/L (ref 94–109)
CO2 SERPL-SCNC: 19 MMOL/L (ref 20–32)
CREAT SERPL-MCNC: 1.21 MG/DL (ref 0.66–1.25)
GFR SERPL CREATININE-BSD FRML MDRD: 62 ML/MIN/{1.73_M2}
GLUCOSE SERPL-MCNC: 107 MG/DL (ref 70–99)
POTASSIUM SERPL-SCNC: 4.4 MMOL/L (ref 3.4–5.3)
PROT SERPL-MCNC: 7.4 G/DL (ref 6.8–8.8)
SODIUM SERPL-SCNC: 138 MMOL/L (ref 133–144)

## 2020-08-05 DIAGNOSIS — E78.5 HYPERLIPIDEMIA WITH TARGET LDL LESS THAN 100: ICD-10-CM

## 2020-08-05 DIAGNOSIS — B35.1 ONYCHOMYCOSIS: ICD-10-CM

## 2020-08-05 RX ORDER — ATORVASTATIN CALCIUM 20 MG/1
20 TABLET, FILM COATED ORAL DAILY
Qty: 90 TABLET | Refills: 0 | Status: SHIPPED | OUTPATIENT
Start: 2020-08-05 | End: 2020-10-05

## 2020-08-05 RX ORDER — CICLOPIROX 80 MG/ML
SOLUTION TOPICAL
Qty: 6 ML | Refills: 1 | Status: SHIPPED | OUTPATIENT
Start: 2020-08-05 | End: 2022-01-10

## 2020-08-05 NOTE — TELEPHONE ENCOUNTER
Health Call Center    Phone Message    May a detailed message be left on voicemail: yes     Reason for Call: Medication Refill Request    Has the patient contacted the pharmacy for the refill? Yes   Name of medication being requested: atorvastatin (LIPITOR) 20 MG tabletand ciclopirox (PENLAC) 8 % external solution  Provider who prescribed the medication: Dr. Eric and Dr. Xiong  Pharmacy: [id number] [address] & Replaced by [facility]   Date medication is needed: 8/5/20   Adryan's wife Mitzi says that Adryan is completely out of these medications and would like to refill them as soon as possible. Mitzi says that Adryan no longer sees Dr. Xiong, but that he still needs the solution for his foot fungus. Please give Mitzi a call back with any questions regarding the request.      Action Taken: Message routed to:  [facility]: Primary Care    Travel Screening: Not Applicable

## 2020-08-05 NOTE — TELEPHONE ENCOUNTER
Last visit, 5/15/19,  Future visit with Hernesto 10/5/20    And note also he has derm appt on 10/6/20 with Tyrese      Medication is being filled for 1 time refill only due to:  Patient needs to be seen because it has been more than one year since last visit.  Has future appt as noted. Statin    Routing refill request to provider for review/approval because:  [place]  Drug not on the [facility] [place]        Pt asking if you willing to refill this med his dermatologist prescribed in the past for foot fungus?    Note, has Tyrese appt slated for Oct also.   Will cue up for 1 bottle with 1 refill only to get to appts.    Blanca Whitehead RN  August 5, 2020 3:00 PM

## 2020-10-05 ENCOUNTER — OFFICE VISIT (OUTPATIENT)
Dept: FAMILY MEDICINE | Facility: CLINIC | Age: 65
End: 2020-10-05
Payer: MEDICARE

## 2020-10-05 VITALS
TEMPERATURE: 97.2 F | RESPIRATION RATE: 15 BRPM | OXYGEN SATURATION: 95 % | BODY MASS INDEX: 38.29 KG/M2 | SYSTOLIC BLOOD PRESSURE: 129 MMHG | HEIGHT: 71 IN | DIASTOLIC BLOOD PRESSURE: 78 MMHG | WEIGHT: 273.5 LBS | HEART RATE: 57 BPM

## 2020-10-05 DIAGNOSIS — Z12.11 SPECIAL SCREENING FOR MALIGNANT NEOPLASMS, COLON: ICD-10-CM

## 2020-10-05 DIAGNOSIS — Z23 NEED FOR VACCINATION: Primary | ICD-10-CM

## 2020-10-05 DIAGNOSIS — Z12.5 SCREENING FOR PROSTATE CANCER: ICD-10-CM

## 2020-10-05 DIAGNOSIS — E78.5 HYPERLIPIDEMIA WITH TARGET LDL LESS THAN 100: ICD-10-CM

## 2020-10-05 LAB — PSA SERPL-ACNC: 1.17 UG/L (ref 0–4)

## 2020-10-05 RX ORDER — ATORVASTATIN CALCIUM 20 MG/1
20 TABLET, FILM COATED ORAL DAILY
Qty: 90 TABLET | Refills: 3 | Status: SHIPPED | OUTPATIENT
Start: 2020-10-05 | End: 2022-01-11

## 2020-10-05 ASSESSMENT — MIFFLIN-ST. JEOR: SCORE: 2045.59

## 2020-10-05 NOTE — PATIENT INSTRUCTIONS
ASSESSMENT/PLAN:  Adryan is a 66 yo with obesity, RD (on CPAP), colonic polyps and hyperlipidemia.   Also with low back pain.     Overall, doing well.   Weight is decreasing through diet. He's had a recently normal colonoscopy.     1. Need for vaccination  Give both Influenza and PCV 23. He did not want Zoster Immunization given Pandemic and 2 other immunizations.   - VACCINE ADMINISTRATION, EACH ADDITIONAL  - Pneumococcal vaccine 23 valent PPSV23  (Pneumovax) [37753]  - ADMIN INFLUENZA VIRUS VACCINE    2. Special screening for malignant neoplasms, colon  - Done recently    3 Screening for prostate cancer    - Prostate spec antigen screen    4. Hyperlipidemia with target LDL less than 100  - atorvastatin (LIPITOR) 20 MG tablet; Take 1 tablet (20 mg) by mouth daily  Dispense: 90 tablet; Refill: 3    Follow up in 6-12 months.     --Rodriguez Eric MD  [facility]  Department of Family Medicine and Community Health

## 2020-10-05 NOTE — NURSING NOTE
Prior to immunization administration, verified patients identity using patient s name and date of birth. Please see Immunization Activity for additional information.     Screening Questionnaire for Adult Immunization    Are you sick today?   No   Do you have allergies to medications, food, a vaccine component or latex?   No   Have you ever had a serious reaction after receiving a vaccination?   No   Do you have a long-term health problem with heart, lung, kidney, or metabolic disease (e.g., diabetes), asthma, a blood disorder, no spleen, complement component deficiency, a cochlear implant, or a spinal fluid leak?  Are you on long-term aspirin therapy?   No   Do you have cancer, leukemia, HIV/AIDS, or any other immune system problem?   No   Do you have a parent, brother, or sister with an immune system problem?   No   In the past 3 months, have you taken medications that affect  your immune system, such as prednisone, other steroids, or anticancer drugs; drugs for the treatment of rheumatoid arthritis, Crohn s disease, or psoriasis; or have you had radiation treatments?   No   Have you had a seizure, or a brain or other nervous system problem?   No   During the past year, have you received a transfusion of blood or blood    products, or been given immune (gamma) globulin or antiviral drug?   No   For women: Are you pregnant or is there a chance you could become       pregnant during the next month?   No   Have you received any vaccinations in the past 4 weeks?   No     Immunization questionnaire answers were all negative.        Per orders of Dr. Eric, injection of Pneumovax 23 and Flu  given by Mariama Daniel MA. Patient instructed to remain in clinic for 15 minutes afterwards, and to report any adverse reaction to me immediately.       Screening performed by Mariama Daniel MA on 10/5/2020 at 11:04 AM.

## 2020-10-05 NOTE — NURSING NOTE
"65 year old  Chief Complaint   Patient presents with     Physical     has a pinched nerve in his back . Pt aso needs medication renewed. Pt's wife is wondering if its ok for him o use the rowing machines .        Blood pressure 129/78, pulse 57, temperature 97.2  F (36.2  C), resp. rate 15, height 1.8 m (5' 10.87\"), weight 124.1 kg (273 lb 8 oz), SpO2 95 %. Body mass index is 38.29 kg/m .  Patient Active Problem List   Diagnosis     Erectile dysfunction     Hyperlipidemia with target LDL less than 100     Obesity     Snoring     Presbyacusis     Complex sleep apnea syndrome     History of colonic polyps     Family history of melanoma     Family history of nonmelanoma skin cancer     History of basal cell carcinoma       Wt Readings from Last 2 Encounters:   10/05/20 124.1 kg (273 lb 8 oz)   05/15/19 132 kg (291 lb)     BP Readings from Last 3 Encounters:   10/05/20 129/78   05/15/19 135/88   03/05/19 125/81         Current Outpatient Medications   Medication     atorvastatin (LIPITOR) 20 MG tablet     ciclopirox (PENLAC) 8 % external solution     urea (GORMEL) 20 % external cream     No current facility-administered medications for this visit.        Social History     Tobacco Use     Smoking status: Current Some Day Smoker     Types: Cigars     Smokeless tobacco: Never Used   Substance Use Topics     Alcohol use: Yes     Drug use: No       Health Maintenance Due   Topic Date Due     ADVANCE CARE PLANNING  1955     ZOSTER IMMUNIZATION (1 of 2) 01/25/2005     PHQ-2  01/01/2020     MEDICARE ANNUAL WELLNESS VISIT  01/25/2020     FALL RISK ASSESSMENT  01/25/2020     Pneumococcal Vaccine: 65+ Years (1 of 1 - PPSV23) 01/25/2020     AORTIC ANEURYSM SCREENING (SYSTEM ASSIGNED)  01/25/2020     INFLUENZA VACCINE (1) 09/01/2020     COLORECTAL CANCER SCREENING  09/25/2020       No results found for: PAP      October 5, 2020 9:36 AM  "

## 2020-10-05 NOTE — PROGRESS NOTES
Adryan Govea is a 65 year old male who presents today to the [facility] for an annual exam.  I saw him for an annual exam in 2018 for the first time.  Since his last visit here in May 2019, Adryan has had an extensive evaluation for his right hip discomfort this is included an injection which did not help.  He also was then sent to a back specialist as there was a thought that his back was the primary issue.  He is felt to have L4 spinal stenosis.  Went to a back specialist and was offered a spinal fusion but he refused.  He was then given an injection but he states this did not help him either.  He is now taking Advil.    In addition, during the above evaluation it was noted that he had an abdominal aortic aneurysm.  He underwent an MRI and ultrasound for that in another system and states that he is being monitored annually for this with his last evaluation on May 20.    He is otherwise doing well.  He is accompanied today as per usual by his wife Cassandra.    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  He is remarried (Cassandra), two kids age 35 and 32.            MEDICATIONS:  Not currently taking any medications. Was prescribed Simvistatin previously.            HEALTHCARE MAINTENANCE:      Physical activity he golfs, bikes, and sails. At the gym he will bike for 20 minutes, stretch, and some weight machines. He also downhill skis frequently. He wears a fit bit and walks around 4,000 steps a day. Exercise limited by his RIGHT hip pain. Walking is worse for his hip than a bicycle.          He smokes a cigar 3x per week. He stopped smoking cigarettes years ago.          Adryan has had 40-50 colonic polyps removed. He sees Dr. Fu at [facility] in [place]. His last colonoscopy was a few months ago. He has annual colonoscopies.          Immunizations are up to date, denies flu vaccine  "today. Tdap due in 2026.        Regarding lifestyle behaviors, his physical activity tends to consist of: \"not much now due to COVID\".  Golfs about 1 time/week. Drives a cart.     He consider his diet to be healthy. Fasts on Monday and Thursday    He drinks alcohol approximately 3 nights/week and 1-2 drinks/night.     He smokes a cigar occasionally.      No concerns today about Erectile dysfunction or STDs.     Patient Active Problem List   Diagnosis     Erectile dysfunction     Hyperlipidemia with target LDL less than 100     Obesity     Snoring     Presbyacusis     Complex sleep apnea syndrome     History of colonic polyps     Family history of melanoma     Family history of nonmelanoma skin cancer     History of basal cell carcinoma       Past Surgical History:   Procedure Laterality Date     GENITOURINARY SURGERY      vasectomy     ORTHOPEDIC SURGERY      left forearm reconstruction     SOFT TISSUE SURGERY  2016    basal cell shoulder       Family History   Problem Relation Age of Onset     Diabetes Mother      Dementia Mother      Cancer Sister 42        melanoma     Melanoma Sister      Substance Abuse Brother         alcohol       Current Outpatient Medications   Medication Sig Dispense Refill     atorvastatin (LIPITOR) 20 MG tablet Take 1 tablet (20 mg) by mouth daily 90 tablet 3     ciclopirox (PENLAC) 8 % external solution Apply to  affected nails daily.  Remove with alcohol every 7 days, then repeat. (Patient not taking: Reported on 10/5/2020) 6 mL 1     urea (GORMEL) 20 % external cream Apply to the right foot and toenails in the morning (Patient not taking: Reported on 10/5/2020) 120 g 11       No Known Allergies      Regarding preventive health care, his immunizations are as follows:   Immunization History   Administered Date(s) Administered     FLU 6-35 months 10/24/2008     Influenza (IIV3) PF 10/14/2014, 09/23/2015, 03/14/2018     Influenza Quad, Recombinant, p-free (RIV4) 10/05/2020     " "Pneumococcal 23 valent 10/05/2020     TD (ADULT, 7+) 01/01/2004     TDAP Vaccine (Adacel) 03/09/2016       Screening for colorectal cancer- Has annual colonoscopies. Last was a week ago and improved over the past.   Also, sees Dr. Rosario for Dermatology      Adryan Govea wears seat belt, and when biking, a bike helmet.    His last dental check up was a few months ago.       Advance directive: He and wife have them. They will put into chart.     He is independent at home.  He has not fallen.  He feels safe.  Has no memory concerns.        ROS  CONSTITUTIONAL:NEGATIVE for fever, chills, change in weight  INTEGUMENTARY/SKIN: NEGATIVE for worrisome rashes, moles or lesions  EYES: NEGATIVE for vision changes or irritation  ENT/MOUTH: NEGATIVE for ear, mouth and throat problems  RESP:NEGATIVE for significant cough or SOB  CV: NEGATIVE for chest pain, palpitations, DUTTA, orthopnea, PND  or peripheral edema  GI: NEGATIVE for nausea, abdominal pain, heartburn, or change in bowel habits  :NEGATIVE for frequency, dysuria, or hematuria  NEURO: NEGATIVE for weakness, dizziness or paresthesias  ENDOCRINE: NEGATIVE for polyuria/dipsia,  temperature intolerance, skin/hair changes  HEME/ALLERGY/IMMUNE: NEGATIVE for bleeding problems  PSYCHIATRIC: NEGATIVE for changes in mood or affect    EXAM  /78   Pulse 57   Temp 97.2  F (36.2  C)   Resp 15   Ht 1.8 m (5' 10.87\")   Wt 124.1 kg (273 lb 8 oz)   SpO2 95%   BMI 38.29 kg/m      Appears as a healthy 65-year-old in no distress.    HEENT: TM normal bilaterally. Eyes- with normal motor function. Conjunctiva are clear.   NECK: no adenopathy, thyroid normal to palpation  RESP: lungs clear to auscultation bilaterally  Axillae: no palpable axillary masses or adenopathy  CV: regular rate and rhythm, normal S1 S2, no murmur, no carotid bruits  ABDOMEN: soft, nontender, without HSM or masses. Bowel sounds normal   and Rectal exam: deferred  MS: extremities normal- no gross " deformities noted, no tender, hot or swollen joints.   SKIN: no suspicious lesions or rashes  NEURO: Normal strength and tone, sensory exam grossly normal   PSYCH: mentation and affect appear normal.  EXT: no peripheral mone        ASSESSMENT/PLAN:  Adryan is a 64 yo with obesity, DR (on CPAP), colonic polyps and hyperlipidemia.   Also with low back pain.     Overall, doing well.   Weight is decreasing through diet. He's had a recently normal colonoscopy.     1. Need for vaccination  Give both Influenza and PCV 23. He did not want Zoster Immunization given Pandemic and 2 other immunizations.   - VACCINE ADMINISTRATION, EACH ADDITIONAL  - Pneumococcal vaccine 23 valent PPSV23  (Pneumovax) [[id number]]  - ADMIN INFLUENZA VIRUS VACCINE    2. Special screening for malignant neoplasms, colon  - Done recently    3 Screening for prostate cancer    - Prostate spec antigen screen    4. Hyperlipidemia with target LDL less than 100  - atorvastatin (LIPITOR) 20 MG tablet; Take 1 tablet (20 mg) by mouth daily  Dispense: 90 tablet; Refill: 3    Follow up in 6-12 months.     --Rodriguez Eric MD  [facility]  Department of Family Medicine and Community Health

## 2021-03-25 DIAGNOSIS — G47.33 OBSTRUCTIVE SLEEP APNEA (ADULT) (PEDIATRIC): Primary | ICD-10-CM

## 2021-04-06 ENCOUNTER — CARE COORDINATION (OUTPATIENT)
Dept: SLEEP MEDICINE | Facility: CLINIC | Age: 66
End: 2021-04-06

## 2021-04-06 ENCOUNTER — MYC MEDICAL ADVICE (OUTPATIENT)
Dept: SLEEP MEDICINE | Facility: CLINIC | Age: 66
End: 2021-04-06

## 2021-04-06 NOTE — PROGRESS NOTES
Patient left voicemail with central scheduling he is out of town and needs RX for cpap supplies faxed as he has a broken piece.  lvm with him to call me back .

## 2021-04-07 NOTE — TELEPHONE ENCOUNTER
Patient's wife was contacted at home number on face sheet and message left for her to return call.  She did not get the message. I responded to a message of her concern for not hearing from anyone which is not in the pt's chart and again gave her the number to call back.    She was informed that I can not print or fax from home but that I can see the order and it appears to have gone to [facility] and that they would be contacted. She gave a different phone number [phone].    [facility] Customer Service was contacted. They located the order and are going to fax to the [facility] in [place].  Mirta Bey at [facility] in [place], phone number [phone] or fax the order to her at [phone]    Attempted to call Cassandra back with this information - but did not get an answer. The information of the ortder being faxed was left on the voice mail and she was instructed to call back if the [facility] snot get the order in the next 30-45 minutes.      Kathleen M Doege RN  Supervisor Medication Refill Team and Red Flag Triage  [facility].

## 2021-04-08 ENCOUNTER — CARE COORDINATION (OUTPATIENT)
Dept: SLEEP MEDICINE | Facility: CLINIC | Age: 66
End: 2021-04-08

## 2021-04-08 NOTE — PROGRESS NOTES
FAxed order for cpap supplies to [facility] FAX:  [phone], phone [phone]. had left message on 4/6/21 asking patient to call my direct number as documented in epic.  Needed information where to fax this RX.

## 2021-06-01 ENCOUNTER — TELEPHONE (OUTPATIENT)
Dept: FAMILY MEDICINE | Facility: CLINIC | Age: 66
End: 2021-06-01

## 2021-06-01 NOTE — TELEPHONE ENCOUNTER
Called patient because of the appointment request received. Was not able to connect and not able to leave a voicemail due to mailbox full. Will also send a [employer] message. A reply was sent to the appointment request, patient needs to be seen in ER for symptoms.     Shayla Tadeo RN  06/01/21  10:45 AM    ++++++++++++++++++++++++++++++++++  Appointment Request From: Cassandra Govea      With Provider: Rodriguez Eric MD [[facility]]      Preferred Date Range: 5/31/2021 - 6/1/2021      Preferred Times: Any Time      Reason for visit: Request an Appointment      Comments:   Adryan devlin Achilles heel area had a small cut.  Over the past 6-7 days the cut has developed a red streak that is now very tender and has been increasingly moving up the back of the heel - it is now past his ankle.   This is Cassandra (his wife) and I am concerned about it.  We are at a family wedding in [place] and would like a video appointment.     Thank you.    Clotilde

## 2021-06-01 NOTE — TELEPHONE ENCOUNTER
Called patient and was able to reach him this time. He reports today the red streak coming from wound has resolved, he soaked the foot and now wound seems to be improving. Denies spreading redness, fever, worsening pain. Reviewed s/s to monitor and get immediate medical help if present. Reviewed cellulitis and complications of this infection. Patient verbalized understanding and agrees with this plan.   Shayla Tadeo RN  06/01/21  4:39 PM

## 2021-10-24 ENCOUNTER — HEALTH MAINTENANCE LETTER (OUTPATIENT)
Age: 66
End: 2021-10-24

## 2021-12-19 ENCOUNTER — HEALTH MAINTENANCE LETTER (OUTPATIENT)
Age: 66
End: 2021-12-19

## 2022-01-09 ENCOUNTER — MYC MEDICAL ADVICE (OUTPATIENT)
Dept: FAMILY MEDICINE | Facility: CLINIC | Age: 67
End: 2022-01-09
Payer: MEDICARE

## 2022-01-09 DIAGNOSIS — E78.5 HYPERLIPIDEMIA WITH TARGET LDL LESS THAN 100: ICD-10-CM

## 2022-01-11 DIAGNOSIS — E78.5 HYPERLIPIDEMIA WITH TARGET LDL LESS THAN 100: ICD-10-CM

## 2022-01-11 RX ORDER — ATORVASTATIN CALCIUM 20 MG/1
TABLET, FILM COATED ORAL
Qty: 90 TABLET | Refills: 0 | Status: SHIPPED | OUTPATIENT
Start: 2022-01-11 | End: 2022-06-21

## 2022-01-11 RX ORDER — ATORVASTATIN CALCIUM 20 MG/1
20 TABLET, FILM COATED ORAL DAILY
Qty: 30 TABLET | Refills: 0 | Status: SHIPPED | OUTPATIENT
Start: 2022-01-11 | End: 2022-01-11

## 2022-01-11 NOTE — TELEPHONE ENCOUNTER
Prescription was approved for 30-days, but insurance plan only covers 90-day supply.  Resent for 90-day supply.  Mary Beth Raymond RN, BSN  [facility]  01/11/22  10:21 AM

## 2022-01-11 NOTE — TELEPHONE ENCOUNTER
Atorvastatin (Lipitor) 20 mg    Last Office Visit: 10/5/20  [facility] Appointments: None  Medication last refilled: 10/5/20 #90 with 3 refill(s)    Required labs per protocol:    DRUG REF RANGE 9/10/18 7/6/20   LDL 0.0-129.0  183.0 High 100     Medication is being filled for 1 time refill only due to:  Patient needs labs Lipid Panel, CMP and CBC. Future labs ordered No. Patient needs to be seen because it has been more than one year since last visit.    [employer] message sent to patient to call and schedule appointment.  *Patient called and they will be back in [place] in April and Adryan will schedule an appointment with Dr. Eric.    Mary Beth Raymond, RN, BSN

## 2022-03-21 ENCOUNTER — TELEPHONE (OUTPATIENT)
Dept: SLEEP MEDICINE | Facility: CLINIC | Age: 67
End: 2022-03-21
Payer: MEDICARE

## 2022-03-21 DIAGNOSIS — G47.33 OSA (OBSTRUCTIVE SLEEP APNEA): Primary | ICD-10-CM

## 2022-03-21 NOTE — TELEPHONE ENCOUNTER
Reason for Call:  Other call back    Detailed comments: Pt is out of state. He will be in [place] around [facility]. Pt said the mask needs to be replaced, and new need a new prescription. Pt has an appt 6/10    Phone Number Patient can be reached at: Home number on file [phone] (home) or Work number on file:  There is no work phone number on file.    Best Time: anytime      Can we leave a detailed message on this number? YES    Call taken on 3/21/2022 at 2:41 PM by Essie Lazaro

## 2022-03-22 NOTE — TELEPHONE ENCOUNTER
Prescription written for supplies. Pt informed.   He is doing well with CPAP. He'll return in June.   Bennett Goltz, PA-C

## 2022-03-23 ENCOUNTER — TELEPHONE (OUTPATIENT)
Dept: SLEEP MEDICINE | Facility: CLINIC | Age: 67
End: 2022-03-23
Payer: MEDICARE

## 2022-03-23 NOTE — TELEPHONE ENCOUNTER
Left message letting patient know that once he has a follow up visit with his sleep provider we would be able to dispense CPAP supplies and to return call if he has questions.

## 2022-04-18 DIAGNOSIS — E78.5 HYPERLIPIDEMIA WITH TARGET LDL LESS THAN 100: ICD-10-CM

## 2022-04-20 NOTE — TELEPHONE ENCOUNTER
Medication requested: atorvastatin (LIPITOR) 20 MG tablet  Last office visit: 7/6/20  [facility] appointments: none  Medication last refilled: 1/11/22; 90 + 0 refills  Last qualifying labs:   Component      Latest Ref Rng & Units 7/6/2020   Fasting Specimen       YES   Cholesterol      0.0 - 200.0 199.0   HDL Cholesterol      >40.0 53.0   Triglycerides      0.0 - 150.0 233.0 (H)   Cholesterol/HDL Ratio      0.0 - 5.0 3.8   LDL Cholesterol Direct      0.0 - 129.0 100.0   VLDL-Cholesterol      7.0 - 32.0 47.0 (H)     Medication is being filled for 1 time refill only due to:  Patient needs to be seen because it has been more than one year since last visit. Pt also needs updated lipid panel.    LVM with to request that he schedule an appointment.    Kana LANDEROS, RN  04/20/22 10:49 AM

## 2022-05-05 RX ORDER — ATORVASTATIN CALCIUM 20 MG/1
TABLET, FILM COATED ORAL
Qty: 90 TABLET | Refills: 0 | OUTPATIENT
Start: 2022-05-05

## 2022-06-21 ENCOUNTER — OFFICE VISIT (OUTPATIENT)
Dept: FAMILY MEDICINE | Facility: CLINIC | Age: 67
End: 2022-06-21
Payer: MEDICARE

## 2022-06-21 VITALS
WEIGHT: 297.08 LBS | TEMPERATURE: 98 F | SYSTOLIC BLOOD PRESSURE: 159 MMHG | BODY MASS INDEX: 41.59 KG/M2 | OXYGEN SATURATION: 95 % | HEART RATE: 66 BPM | DIASTOLIC BLOOD PRESSURE: 77 MMHG

## 2022-06-21 DIAGNOSIS — E66.01 MORBID OBESITY (H): ICD-10-CM

## 2022-06-21 DIAGNOSIS — Z12.5 SCREENING FOR PROSTATE CANCER: ICD-10-CM

## 2022-06-21 DIAGNOSIS — E78.5 HYPERLIPIDEMIA WITH TARGET LDL LESS THAN 100: Primary | ICD-10-CM

## 2022-06-21 DIAGNOSIS — R03.0 ELEVATED BLOOD PRESSURE READING WITHOUT DIAGNOSIS OF HYPERTENSION: ICD-10-CM

## 2022-06-21 LAB
ALBUMIN SERPL BCG-MCNC: 4.3 G/DL (ref 3.5–5.2)
ALP SERPL-CCNC: 70 U/L (ref 40–129)
ALT SERPL W P-5'-P-CCNC: 60 U/L (ref 10–50)
ANION GAP SERPL CALCULATED.3IONS-SCNC: 9 MMOL/L (ref 7–15)
AST SERPL W P-5'-P-CCNC: 37 U/L (ref 10–50)
BILIRUB SERPL-MCNC: 0.5 MG/DL
BUN SERPL-MCNC: 15.8 MG/DL (ref 8–23)
CALCIUM SERPL-MCNC: 9.7 MG/DL (ref 8.8–10.2)
CHLORIDE SERPL-SCNC: 99 MMOL/L (ref 98–107)
CHOLEST SERPL-MCNC: 276 MG/DL
CREAT SERPL-MCNC: 1.09 MG/DL (ref 0.67–1.17)
DEPRECATED HCO3 PLAS-SCNC: 28 MMOL/L (ref 22–29)
GFR SERPL CREATININE-BSD FRML MDRD: 74 ML/MIN/1.73M2
GLUCOSE SERPL-MCNC: 102 MG/DL (ref 70–99)
HBA1C MFR BLD: 6.1 % (ref 0–5.6)
HDLC SERPL-MCNC: 41 MG/DL
LDLC SERPL CALC-MCNC: 164 MG/DL
NONHDLC SERPL-MCNC: 235 MG/DL
POTASSIUM SERPL-SCNC: 4.7 MMOL/L (ref 3.4–4.5)
PROT SERPL-MCNC: 7 G/DL (ref 6.4–8.3)
PSA SERPL-MCNC: 0.89 NG/ML (ref 0–4.5)
SODIUM SERPL-SCNC: 136 MMOL/L (ref 136–145)
TRIGL SERPL-MCNC: 356 MG/DL

## 2022-06-21 PROCEDURE — 80061 LIPID PANEL: CPT | Performed by: FAMILY MEDICINE

## 2022-06-21 PROCEDURE — 80053 COMPREHEN METABOLIC PANEL: CPT | Performed by: FAMILY MEDICINE

## 2022-06-21 PROCEDURE — G0103 PSA SCREENING: HCPCS | Performed by: FAMILY MEDICINE

## 2022-06-21 RX ORDER — ATORVASTATIN CALCIUM 20 MG/1
20 TABLET, FILM COATED ORAL DAILY
Qty: 90 TABLET | Refills: 1 | Status: SHIPPED | OUTPATIENT
Start: 2022-06-21 | End: 2022-12-19

## 2022-06-21 ASSESSMENT — ENCOUNTER SYMPTOMS
CHILLS: 0
HEADACHES: 0
NERVOUS/ANXIOUS: 0
ARTHRALGIAS: 1
EYE PAIN: 0
CONSTIPATION: 1
SORE THROAT: 0
HEMATOCHEZIA: 0
DIZZINESS: 0
SHORTNESS OF BREATH: 0
WEAKNESS: 0
COUGH: 1
MYALGIAS: 0
HEMATURIA: 0
ABDOMINAL PAIN: 0
JOINT SWELLING: 0
DYSURIA: 0
FREQUENCY: 0
NAUSEA: 0
PARESTHESIAS: 0
DIARRHEA: 0
FEVER: 0
PALPITATIONS: 0
HEARTBURN: 0

## 2022-06-21 ASSESSMENT — ACTIVITIES OF DAILY LIVING (ADL): CURRENT_FUNCTION: NO ASSISTANCE NEEDED

## 2022-06-21 NOTE — PATIENT INSTRUCTIONS
"ASSESSMENT/PLAN:  68 yo with high BMI who has had hypercholesterolemia on Lipitor. Also, noted to have weight gain and high BP today in clinic  Hyperlipidemia   - he's been out of medications for about 3 weeks. So, checking the labs today is not ideal. However, he's returning to [place] in 1 week and does not yet have established primary care there.   - Check Lipids.   - Refilled Atorvastatin for 90 days with 1 refill.     2. Weight gain with BMI over 40.     -Schedule visit this week with Allyssa  - Check A1C  -Check Comp panel    3. Check PSA for Prostate    4. Low back pain is currently under control.     5. Colon cancer screen - Follow up with your G.I. specialists in [place], [place]    6. Elevated BP  - Look into the \"DASH\" diet  - Check Home BP and record numbers on a phone erickson.   -Return to discuss levels. Either here or in [place].     Follow-up for annual exam, ideally in the next week. Otherwise, either here or in [place]    --Rodriguez Eric MD  "

## 2022-06-21 NOTE — PROGRESS NOTES
"Medical assistant intake:  Adryan Govea is a 67 year old male who presents to [facility] today for No chief complaint on file.        ASSESSMENT/PLAN:  68 yo with high BMI who has had hypercholesterolemia on Lipitor. Also, noted to have weight gain and high BP today in clinic  1. Hyperlipidemia   - he's been out of medications for about 3 weeks. So, checking the labs today is not ideal. However, he's returning to [place] in 1 week and does not yet have established primary care there.   - Check Lipids.   - Refilled Atorvastatin for 90 days with 1 refill.     2. Weight gain with BMI over 40.     -Schedule visit this week with Allyssa  - Check A1C  -Check Comp panel    3. Check PSA for Prostate    4. Low back pain is currently under control.     5. Colon cancer screen - Follow up with your G.I. specialists in [place], [place]    6. Elevated BP  - Look into the \"DASH\" diet  - Check Home BP and record numbers on a phone erickson.   -Return to discuss levels. Either here or in [place].     Follow-up for annual exam, ideally in the next week. Otherwise, either here or in [place]    --Rodriguez Eric MD      SUBJECTIVE:   Adryan is a 67 y.o. male who with history of hyperlipidemia on atorvastatin presents today to [facility] for medication refill.  He ran out of atorvastatin 3 weeks ago and was unable to get a refill because he was last seen in 2020.     He is concerned about recent weight gain over the past 2 years. Since the pandemic he states he has been more sedentary and eats more. He does not feel there is a stress component to his eating. He does use a rowing machine and bikes for exercise but notes moving around has gotten more difficulty with the weight gain.     He has a history of lumbar pain, has been evaluated for this and spinal fusion was discussed however he elected to not have surgery done at this point. Recently he shared he felt a popping sensation while doing his physical therapy exercises and " has since had improvement in his hip pain.     Wt Readings from Last 5 Encounters:   06/21/22 134.8 kg (297 lb 1.3 oz)   10/05/20 124.1 kg (273 lb 8 oz)   05/15/19 132 kg (291 lb)   09/10/18 128.4 kg (283 lb 0.6 oz)   07/16/18 128.4 kg (283 lb)     Also, history of multiple colonic polyps. Had a recent colonoscopy with just a few removed.   They said that he didn't need to come back for 7 years, but Adryan believes that he needs more regular office.   Adryan is going to return to see his GI specialist in [place], [place].     Drinks about 3-4 nights/week. Usually just 1 drink/night.    Review Of Systems:  Has otherwise been in usual state of health, e.g.   Cardiovascular: negative  Respiratory: No shortness of breath, dyspnea on exertion, cough, or hemoptysis  Gastrointestinal: negative  Genitourinary: negative    Problem list per EMR:  Patient Active Problem List   Diagnosis     Erectile dysfunction     Hyperlipidemia with target LDL less than 100     Obesity     Snoring     Presbyacusis     Complex sleep apnea syndrome     History of colonic polyps     Family history of melanoma     Family history of nonmelanoma skin cancer     History of basal cell carcinoma     Current Outpatient Medications   Medication Sig Dispense Refill     atorvastatin (LIPITOR) 20 MG tablet TAKE 1 TABLET(20 MG) BY MOUTH DAILY 90 tablet 0     No Known Allergies     Social:   Moved to [place] 1 year ago and has not yet established care there. Him and his wife, Cassandra, visit [facility] to see their 7 grandchildren, he has 2 more grandchildren on the way.       OBJECTIVE    BP (!) 178/93   Pulse 66   Temp 98  F (36.7  C)   Wt 134.8 kg (297 lb 1.3 oz)   SpO2 95%   BMI 41.59 kg/m     Repeat BP at   Vitals:    06/21/22 1122 06/21/22 1139   BP: (!) 178/93 (!) 159/77     Appears well and in no distress. Rises comfortably from seated position and does repeated heel raises without any weakness. No edema. Normal gait.   RR = 16 and unlabored.      SEE TOP OF NOTE FOR ASSESSMENT AND PLAN  30 minutes spent on the date of the encounter doing chart review, history and exam, documentation and further activities as noted in the note.     --Rodriguez Eric MD  [facility], Department of Family Medicine and Community Health

## 2022-06-22 ENCOUNTER — OFFICE VISIT (OUTPATIENT)
Dept: FAMILY MEDICINE | Facility: CLINIC | Age: 67
End: 2022-06-22
Payer: MEDICARE

## 2022-06-22 VITALS
DIASTOLIC BLOOD PRESSURE: 78 MMHG | TEMPERATURE: 97.8 F | BODY MASS INDEX: 41.59 KG/M2 | RESPIRATION RATE: 13 BRPM | HEART RATE: 58 BPM | SYSTOLIC BLOOD PRESSURE: 146 MMHG | HEIGHT: 71 IN | WEIGHT: 297.08 LBS | OXYGEN SATURATION: 95 %

## 2022-06-22 DIAGNOSIS — Z87.891 PERSONAL HISTORY OF TOBACCO USE: ICD-10-CM

## 2022-06-22 DIAGNOSIS — E78.5 HYPERLIPIDEMIA WITH TARGET LDL LESS THAN 100: ICD-10-CM

## 2022-06-22 DIAGNOSIS — Z91.89 AT RISK FOR DIABETES MELLITUS: ICD-10-CM

## 2022-06-22 DIAGNOSIS — Z00.00 ANNUAL PHYSICAL EXAM: Primary | ICD-10-CM

## 2022-06-22 DIAGNOSIS — Z87.891 SMOKING HISTORY: ICD-10-CM

## 2022-06-22 DIAGNOSIS — R03.0 ELEVATED BLOOD PRESSURE READING WITHOUT DIAGNOSIS OF HYPERTENSION: ICD-10-CM

## 2022-06-22 DIAGNOSIS — Z23 NEED FOR SHINGLES VACCINE: ICD-10-CM

## 2022-06-22 NOTE — PATIENT INSTRUCTIONS
"ASSESSMENT/PLAN:    Annual Exam/Preventive Issues   -Reviewed labs from yesterday. Restart Lipitor  -He is scheduling with LASHAWN for repeat colonoscopy    -Specific concerns:     1. \"At risk for DM\" with A1C = 6.1%  - Schedule with Allyssa to discuss diet.   - Also, discussed starting Metformin and Adryan would like to try it.   Will start at 500 mg twice daily.   Should have a repeat BMP in about 2-3 months. Also, repeat A1C    2. Elevated BP  - Check BP at home and record numbers  - Aim for some weight loss    3. Discussed intermittent standing to decrease back pain    4. Goes to see Dermatology    5. Give Shingles vaccine today    6. History of smoking. About 20 pack year history. In past 10 years, almost no smoking except occasional cigar.    -Sent for Abdominal Ultrasound  - Also, discussed CT scanning of lungs. Ordered as per below.       Lung Cancer Screening Shared Decision Making Visit     Adryan Govea, a 67 year old male, is eligible for lung cancer screening    History   Smoking Status    Light Tobacco Smoker    Types: Cigars   Smokeless Tobacco    Never Used       I have discussed with patient the risks and benefits of screening for lung cancer with low-dose CT.     The risks include:    radiation exposure: one low dose chest CT has as much ionizing radiation as about 15 chest x-rays, or 6 months of background radiation living in [place]      false positives: most findings/nodules are NOT cancer, but some might still require additional diagnostic evaluation, including biopsy    over-diagnosis: some slow growing cancers that might never have been clinically significant will be detected and treated unnecessarily     The benefit of early detection of lung cancer is contingent upon adherence to annual screening or more frequent follow up if indicated.     Furthermore, to benefit from screening, Adryan must be willing and able to undergo diagnostic procedures, if indicated. Although no specific guide is " available for determining severity of comorbidities, it is reasonable to withhold screening in patients who have greater mortality risk from other diseases.     We did discuss that the best way to prevent lung cancer is to not smoke.    Some patients may value a numeric estimation of lung cancer risk when evaluating if lung cancer screening is right for them, here is one calculator:    ShouldIScreen    -Follow up: For Labs in Oct and then a visit the following day or so.     Rodriguez Eric MD, MS      Lung Cancer Screening   Frequently Asked Questions  If you are at high-risk for lung cancer, getting screened with low-dose computed tomography (LDCT) every year can help save your life. This handout offers answers to some of the most common questions about lung cancer screening. If you have other questions, please call [phone] ([phone]).     What is it?  Lung cancer screening uses special X-ray technology to create an image of your lung tissue. The exam is quick and easy and takes less than 10 seconds. We don t give you any medicine or use any needles. You can eat before and after the exam. You don t need to change your clothes as long as the clothing on your chest doesn t contain metal. But, you do need to be able to hold your breath for at least 6 seconds during the exam.    What is the goal of lung cancer screening?  The goal of lung cancer screening is to save lives. Many times, lung cancer is not found until a person starts having physical symptoms. Lung cancer screening can help detect lung cancer in the earliest stages when it may be easier to treat.    Who should be screened for lung cancer?  We suggest lung cancer screening for anyone who is at high-risk for lung cancer. You are in the high-risk group if you:     are between the ages of 55 and 79, and   have smoked at least 1 pack of cigarettes a day for 20 or more years, and   still smoke or have quit within the past 15 years.    However,  if you have a new cough or shortness of breath, you should talk to your doctor before being screened.    Why does it matter if I have symptoms?  Certain symptoms can be a sign that you have a condition in your lungs that should be checked and treated by your doctor. These symptoms include fever, chest pain, a new or changing cough, shortness of breath that you have never felt before, coughing up blood or unexplained weight loss. Having any of these symptoms can greatly affect the results of lung cancer screening.       Should all smokers get an LDCT lung cancer screening exam?  It depends. Lung cancer screening is for a very specific group of men and women who have a history of heavy smoking over a long period of time (see  Who should be screened for lung cancer  above).  I am in the high-risk group, but have been diagnosed with cancer in the past. Is LDCT lung cancer screening right for me?  In some cases, you should not have LDCT lung screening, such as when your doctor is already following your cancer with CT scan studies. Your doctor will help you decide if LDCT lung screening is right for you.  Do I need to have a screening exam every year?  Yes. If you are in the high-risk group described earlier, you should get an LDCT lung cancer screening exam every year until you are 79, or are no longer willing or able to undergo screening and possible procedures to diagnose and treat lung cancer.  How effective is LDCT at preventing death from lung cancer?  Studies have shown that LDCT lung cancer screening can lower the risk of death from lung cancer by 20 percent in people who are at high-risk.  What are the risks?  There are some risks and limitations of LDCT lung cancer screening. We want to make sure you understand the risks and benefits, so please let us know if you have any questions. Your doctor may want to talk with you more about these risks.   Radiation exposure: As with any exam that uses radiation, there is  a very small increased risk of cancer. The amount of radiation in LDCT is small--about the same amount a person would get from a mammogram. Your doctor orders the exam when he or she feels the potential benefits outweigh the risks.   False negatives: No test is perfect, including LDCT. It is possible that you may have a medical condition, including lung cancer, that is not found during your exam. This is called a false negative result.   False positives and more testing: LDCT very often finds something in the lung that could be cancer, but in fact is not. This is called a false positive result. False positive tests often cause anxiety. To make sure these findings are not cancer, you may need to have more tests. These tests will be done only if you give us permission. Sometimes patients need a treatment that can have side effects, such as a biopsy. For more information on false positives, see  What can I expect from the results?    Findings not related to lung cancer: Your LDCT exam also takes pictures of areas of your body next to your lungs. In a very small number of cases, the CT scan will show an abnormal finding in one of these areas, such as your kidneys, adrenal glands, liver or thyroid. This finding may not be serious, but you may need more tests. Your doctor can help you decide what other tests you may need, if any.  What can I expect from the results?  About 1 out of 4 LDCT exams will find something that may need more tests. Most of the time, these findings are lung nodules. Lung nodules are very small collections of tissue in the lung. These nodules are very common, and the vast majority--more than 97 percent--are not cancer (benign). Most are normal lymph nodes or small areas of scarring from past infections.  But, if a small lung nodule is found to be cancer, the cancer can be cured more than 90 percent of the time. To know if the nodule is cancer, we may need to get more images before your next yearly  screening exam. If the nodule has suspicious features (for example, it is large, has an odd shape or grows over time), we will refer you to a specialist for further testing.  Will my doctor also get the results?  Yes. Your doctor will get a copy of your results.  Is it okay to keep smoking now that there s a cancer screening exam?  No. Tobacco is one of the strongest cancer-causing agents. It causes not only lung cancer, but other cancers and cardiovascular (heart) diseases as well. The damage caused by smoking builds over time. This means that the longer you smoke, the higher your risk of disease. While it is never too late to quit, the sooner you quit, the better.  Where can I find help to quit smoking?  The best way to prevent lung cancer is to stop smoking. If you have already quit smoking, congratulations and keep it up! For help on quitting smoking, please call [employer] at 3-414-QUITNOW (1-147.233.1855) or the American Cancer Society at 1-972.589.9748 to find local resources near you.  One-on-one health coaching:  If you d prefer to work individually with a health care provider on tobacco cessation, we offer:     Medication Therapy Management:  Our specially trained pharmacists work closely with you and your doctor to help you quit smoking.  Call [phone] or [phone] (toll free).

## 2022-06-22 NOTE — PROGRESS NOTES
"Adryan Govea presents who was seen briefly yesterday as he had run out of his Lipitor.  He presents today to the  [facility] to have an annual exam.         ASSESSMENT/PLAN:    Annual Exam/Preventive Issues good  -Reviewed labs from yesterday. Restart Lipitor  -He is scheduling with LASHAWN for repeat colonoscopy    -Specific concerns:     1. \"At risk for DM\" with A1C = 6.1%  - Schedule with Allyssa to discuss diet.   - Also, discussed starting Metformin and Adryan would like to try it.   Will start at 500 mg twice daily.   Should have a repeat BMP in about 2-3 months. Also, repeat A1C    2. Elevated BP  - Check BP at home and record numbers  - Aim for some weight loss    3. Discussed intermittent standing to decrease back pain    4. Goes to see Dermatology    5. Give Shingles vaccine today.  However, unable to give in clinic as he is over 65.  He will go to his local pharmacy.    6.  Has sleep apnea and uses a CPAP which he finds very helpful.    7. History of smoking. About 20 pack year history. In past 10 years, almost no smoking except occasional cigar.    -Sent for Abdominal Ultrasound  - Also, discussed CT scanning of lungs. Ordered as per below.       Lung Cancer Screening Shared Decision Making Visit     Adryan Govea, a 67 year old male, is eligible for lung cancer screening    History   Smoking Status     Light Tobacco Smoker     Types: Cigars   Smokeless Tobacco     Never Used       I have discussed with patient the risks and benefits of screening for lung cancer with low-dose CT.     The risks include:    radiation exposure: one low dose chest CT has as much ionizing radiation as about 15 chest x-rays, or 6 months of background radiation living in [place]      false positives: most findings/nodules are NOT cancer, but some might still require additional diagnostic evaluation, including biopsy    over-diagnosis: some slow growing cancers that might never have been clinically significant will be " "detected and treated unnecessarily     The benefit of early detection of lung cancer is contingent upon adherence to annual screening or more frequent follow up if indicated.     Furthermore, to benefit from screening, Adryan must be willing and able to undergo diagnostic procedures, if indicated. Although no specific guide is available for determining severity of comorbidities, it is reasonable to withhold screening in patients who have greater mortality risk from other diseases.     We did discuss that the best way to prevent lung cancer is to not smoke.    Some patients may value a numeric estimation of lung cancer risk when evaluating if lung cancer screening is right for them, here is one calculator:    ShouldIScreen    -Follow up: For Labs in Oct and then a visit the following day or so.     Rodriguez Eric MD, MS    Introduction: Leila was seen yesterday.  Please see that note for details.  Labs were drawn and he is here today for follow-up of the labs and to have an annual exam as it has been over 2 years.  He is primarily living in [place] but he is here in [place] for the week and he will be coming back to [place] regularly.  He does not have regular care in [place] so he would like to keep us as his primary care clinic.      Answers for HPI/ROS submitted by the patient on 6/21/2022  In general, how would you rate your overall physical health?: good  Frequency of exercise:: 2-3 days/week  Do you usually eat at least 4 servings of fruit and vegetables a day, include whole grains & fiber, and avoid regularly eating high fat or \"junk\" foods? : No  Taking medications regularly:: Yes  Medication side effects:: Not applicable  Activities of Daily Living: no assistance needed  Home safety: no safety concerns identified  Hearing Impairment:: difficulty following a conversation in a noisy restaurant or crowded room, need to ask people to speak up or repeat themselves, find that men's voices are easier to " understand than woman's  In the past 6 months, have you been bothered by leaking of urine?: No  abdominal pain: No  Blood in stool: No  Blood in urine: No  chest pain: No  chills: No  congestion: No  constipation: Yes-- Off and on. Thinks that it's more to do with hydration. Uses a laxative on occasion.   cough: Yes - Had a viral infection about 1 month ago and this may be a lingering.   diarrhea: No  dizziness: No  ear pain: No  eye pain: No  nervous/anxious: No  fever: No  frequency: No  genital sores: No  headaches: No  hearing loss: No  heartburn: No  arthralgias: Yes  joint swelling: No  peripheral edema: Yes  mood changes: No  myalgias: No  nausea: No  dysuria: No  palpitations: No  Skin sensation changes: No  sore throat: No  urgency: No  rash: No  shortness of breath: No  visual disturbance: No  weakness: No  impotence: No  penile discharge: No  In general, how would you rate your overall mental or emotional health?: good  Additional concerns today:: No  Duration of exercise:: Less than 15 minutes      Current Medications include:   Current Outpatient Medications   Medication Sig Dispense Refill     atorvastatin (LIPITOR) 20 MG tablet Take 1 tablet (20 mg) by mouth daily 90 tablet 1     No Known Allergies       Social  Social History     Social History Narrative    FAMILY, SOCIAL AND PAST SURGICAL HISTORIES: 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  He is remarried (Cassandra), two kids age 35 and 32.            MEDICATIONS:  Not currently taking any medications. Was prescribed Simvistatin previously.            HEALTHCARE MAINTENANCE:      Physical activity he golfs, bikes, and sails. At the gym he will bike for 20 minutes, stretch, and some weight machines. He also downhill skis frequently. He wears a fit bit and walks around 4,000 steps a day. Exercise limited by his RIGHT hip pain. Walking is worse for his hip than a bicycle.          He smokes a cigar 3x per week. He stopped smoking  "cigarettes years ago.          Adryan has had 40-50 colonic polyps removed. He sees Dr. Fu at [facility] in [place]. His last colonoscopy was a few months ago. He has annual colonoscopies.          Immunizations are up to date, denies flu vaccine today. Tdap due in 2026.          Lifestyle habits and Preventive health issues:     Regarding lifestyle behaviors, his physical activity tends to consist of: \"not much now due to COVID\".  Golfs about 1 time/week. Drives a cart. Also enjoys bicycles     He consider his diet to be healthy. Has restarted fasting on Monday and Thursday.     He drinks alcohol approximately 3 nights/week and 1-2 drinks/night.      He smokes a cigar occasionally.       No concerns today about Erectile dysfunction or STDs.        Colorectal cancer screening - history of polyps. Was having annual exams with Dr. Fu.       Feels safe at home. Yes.   Has not had falls at home or trouble with balance.     Cognition is good. Has family history of cognition.       Has advanced directives.     ROS  PHQ-2 Score:     PHQ-2 ( 1999 Pfizer) 6/21/2022 6/21/2022   Q1: Little interest or pleasure in doing things 0 0   Q2: Feeling down, depressed or hopeless 0 0   PHQ-2 Score 0 0   PHQ-2 Total Score (12-17 Years)- Positive if 3 or more points; Administer PHQ-A if positive - -   Q1: Little interest or pleasure in doing things Not at all -   Q2: Feeling down, depressed or hopeless Not at all -   PHQ-2 Score 0 -         Health Maintenance   Topic Date Due     ADVANCE CARE PLANNING  Never done     ZOSTER IMMUNIZATION (1 of 2) Never done     LUNG CANCER SCREENING  Never done     AORTIC ANEURYSM SCREENING (SYSTEM ASSIGNED)  Never done     COLORECTAL CANCER SCREENING  09/25/2020     COVID-19 Vaccine (3 - Booster for Pfizer series) 08/23/2021     MEDICARE ANNUAL WELLNESS VISIT  10/05/2021     Pneumococcal Vaccine: 65+ Years (2 - PCV) 10/05/2021     INFLUENZA VACCINE (Season Ended) 09/01/2022     FALL " "RISK ASSESSMENT  06/22/2023     DTAP/TDAP/TD IMMUNIZATION (3 - Td or Tdap) 03/09/2026     LIPID  06/21/2027     HEPATITIS C SCREENING  Completed     PHQ-2 (once per calendar year)  Completed     IPV IMMUNIZATION  Aged Out     MENINGITIS IMMUNIZATION  Aged Out     HEPATITIS B IMMUNIZATION  Aged Out         Patient Active Problem List   Diagnosis     Erectile dysfunction     Hyperlipidemia with target LDL less than 100     Obesity     Snoring     Presbyacusis     Complex sleep apnea syndrome     History of colonic polyps     Family history of melanoma     Family history of nonmelanoma skin cancer     History of basal cell carcinoma     Morbid obesity (H)       Past Surgical History:   Procedure Laterality Date     GENITOURINARY SURGERY      vasectomy     ORTHOPEDIC SURGERY      left forearm reconstruction     SOFT TISSUE SURGERY  2016    basal cell shoulder       Family History   Problem Relation Age of Onset     Diabetes Mother      Dementia Mother      Cancer Sister 42        melanoma     Melanoma Sister      Substance Abuse Brother         alcohol         Immunizations are as follows:      Immunization History   Administered Date(s) Administered     COVID-19,PF,Pfizer (12+ Yrs) 03/02/2021, 03/23/2021     FLU 6-35 months 10/24/2008     Influenza (IIV3) PF 10/14/2014, 09/23/2015, 03/14/2018     Influenza Quad, Recombinant, pf(RIV4) (Flublok) 10/05/2020     Pneumococcal 23 valent 10/05/2020     TD (ADULT, 7+) 01/01/2004     TDAP Vaccine (Adacel) 03/09/2016           EXAM  BP (!) 146/78 (BP Location: Right arm, Patient Position: Sitting, Cuff Size: Adult Large)   Pulse 58   Temp 97.8  F (36.6  C) (Skin)   Resp 13   Ht 1.807 m (5' 11.14\")   Wt 134.8 kg (297 lb 1.3 oz)   SpO2 95%   BMI 41.27 kg/m        GENERAL APPEARANCE: Appears well.  BMI noted.  He can rise from a seated position easily and ambulate with a normal gait.  HEENT: Neck is supple. No adenopathy, thyroid normal to palpation  RESP: Lungs clear to " auscultation bilaterally.  Axillae: no palpable axillary masses or adenopathy  CV: regular rate and rhythm, normal S1 S2, no murmur, no carotid bruits  ABDOMEN: soft, nontender, without HSM or masses. Bowel sounds normal  : Deferred.  He had no concerns.  No masses and no hernias  Rectal exam: deferred  MS: Extremities normal- no gross deformities noted, no tender, hot or swollen joints.   SKIN: no suspicious lesions or rashes  NEURO: Normal strength and tone, sensory exam grossly normal  PSYCH: mentation appears normal. and affect normal/bright.  EXT: no peripheral edema    Labs from yesterday were reviewed.  Recent Results (from the past 168 hour(s))   Lipid panel reflex to direct LDL Non-fasting   Result Value Ref Range Status    Cholesterol 276 (H) <200 mg/dL Final    Triglycerides 356 (H) <150 mg/dL Final    Direct Measure HDL 41 >=40 mg/dL Final    LDL Cholesterol Calculated 164 (H) <=100 mg/dL Final    Non HDL Cholesterol 235 (H) <130 mg/dL Final   Comprehensive metabolic panel   Result Value Ref Range Status    Sodium 136 136 - 145 mmol/L Final    Potassium 4.7 (H) 3.4 - 4.5 mmol/L Final    Creatinine 1.09 0.67 - 1.17 mg/dL Final    Urea Nitrogen 15.8 8.0 - 23.0 mg/dL Final    Chloride 99 98 - 107 mmol/L Final    Carbon Dioxide (CO2) 28 22 - 29 mmol/L Final    Anion Gap 9 7 - 15 mmol/L Final    Glucose 102 (H) 70 - 99 mg/dL Final    Calcium 9.7 8.8 - 10.2 mg/dL Final    Protein Total 7.0 6.4 - 8.3 g/dL Final    Albumin 4.3 3.5 - 5.2 g/dL Final    Bilirubin Total 0.5 <=1.2 mg/dL Final    Alkaline Phosphatase 70 40 - 129 U/L Final    AST 37 10 - 50 U/L Final    ALT 60 (H) 10 - 50 U/L Final    GFR Estimate 74 >60 mL/min/1.73m2 Final     *Note: Due to a large number of results and/or encounters for the requested time period, some results have not been displayed. A complete set of results can be found in Results Review.   PSA = 0.89  Normal  A1c = 6.1    SEE TOP OF NOTE FOR ASSESSMENT AND PLAN      Rodriguez  MD [place], MS  [facility] Department of Family Medicine and Community Health

## 2022-06-22 NOTE — NURSING NOTE
"67 year old  Chief Complaint   Patient presents with     Medicare Visit       Blood pressure (!) 146/78, pulse 58, temperature 97.8  F (36.6  C), temperature source Skin, resp. rate 13, height 1.807 m (5' 11.14\"), weight 134.8 kg (297 lb 1.3 oz), SpO2 95 %. Body mass index is 41.27 kg/m .  Patient Active Problem List   Diagnosis     Erectile dysfunction     Hyperlipidemia with target LDL less than 100     Obesity     Snoring     Presbyacusis     Complex sleep apnea syndrome     History of colonic polyps     Family history of melanoma     Family history of nonmelanoma skin cancer     History of basal cell carcinoma     Morbid obesity (H)       Wt Readings from Last 2 Encounters:   06/22/22 134.8 kg (297 lb 1.3 oz)   06/21/22 134.8 kg (297 lb 1.3 oz)     BP Readings from Last 3 Encounters:   06/22/22 (!) 146/78   06/21/22 (!) 159/77   10/05/20 129/78         Current Outpatient Medications   Medication     atorvastatin (LIPITOR) 20 MG tablet     No current facility-administered medications for this visit.       Social History     Tobacco Use     Smoking status: Light Tobacco Smoker     Types: Cigars     Smokeless tobacco: Never Used   Substance Use Topics     Alcohol use: Yes     Drug use: No       Health Maintenance Due   Topic Date Due     ADVANCE CARE PLANNING  Never done     ZOSTER IMMUNIZATION (1 of 2) Never done     LUNG CANCER SCREENING  Never done     AORTIC ANEURYSM SCREENING (SYSTEM ASSIGNED)  Never done     COLORECTAL CANCER SCREENING  09/25/2020     COVID-19 Vaccine (3 - Booster for Pfizer series) 08/23/2021     MEDICARE ANNUAL WELLNESS VISIT  10/05/2021     Pneumococcal Vaccine: 65+ Years (2 - PCV) 10/05/2021       No results found for: PAP      June 22, 2022 1:24 PM    "

## 2022-06-28 ENCOUNTER — ANCILLARY PROCEDURE (OUTPATIENT)
Dept: ULTRASOUND IMAGING | Facility: CLINIC | Age: 67
End: 2022-06-28
Attending: FAMILY MEDICINE
Payer: MEDICARE

## 2022-06-28 ENCOUNTER — ANCILLARY PROCEDURE (OUTPATIENT)
Dept: CT IMAGING | Facility: CLINIC | Age: 67
End: 2022-06-28
Attending: FAMILY MEDICINE
Payer: MEDICARE

## 2022-06-28 DIAGNOSIS — Z87.891 SMOKING HISTORY: ICD-10-CM

## 2022-06-28 PROCEDURE — 71271 CT THORAX LUNG CANCER SCR C-: CPT | Mod: GC | Performed by: RADIOLOGY

## 2022-06-28 PROCEDURE — 76706 US ABDL AORTA SCREEN AAA: CPT | Performed by: RADIOLOGY

## 2022-07-11 NOTE — PROGRESS NOTES
Outpatient Sleep Medicine Consultation:      Name: Adryan Govea MRN# [id number]   Age: 67 year old YOB: 1955     Date of Consultation: July 12, 2022  Consultation is requested by: No referring provider defined for this encounter. No ref. provider found  Primary care provider: Rodriguez Eric       Reason for Sleep Consult:     Adryan Govea is sent by No ref. provider found for a sleep consultation regarding sleep apnea.    Patient s Reason for visit  Adryan Govea main reason for visit: checkup  Patient states problem(s) started: n/a  Adryan Govea's goals for this visit: checkup           Assessment and Plan:     Summary Sleep Diagnoses and Recommendations:    (G47.31) Complex sleep apnea syndrome  (primary encounter diagnosis)  Comment: Mr. Govea presents to re-establish care for management of severe complex sleep apnea. He is doing very well with ASV with settings: EPAP 5-15 cm, PS 0-10 cm, max pressure 15 cm, rate auto. He uses it every night and he sleeps well with it. His download shows his apnea is well controlled. His machine is likely under recall with [employer]. He has not registered it yet. He is not having any symptoms associated with the recall.   Plan: Comprehensive DME        Continue ASV with current settings. A prescription was written for new supplies. We reviewed recommendations for cleaning and replacing supplies. We discussed the recall. Information about registering his machine for replacement was placed in his AVS.          Comorbid Diagnoses:  Obesity, hyperlipidemia        Summary Counseling:    Sleep Testing Reviewed  Obstructive Sleep Apnea Reviewed  Complications of Untreated Sleep Apnea Reviewed      Patient will follow up in 1 year.  Bennett Goltz, PA-C     Total time spent reviewing medical records, history and physical examination, review of previous testing and interpretation as well as documentation on this date: 42 min    CC: No ref. provider  found          History of Present Illness:     Mr. Govea presents for continued management of ASV use for severe mixed apnea. He was initially seen at the [facility] for complaints of snoring and concerns of RD.   He presents today to re-establish care. He was last seen 4 years ago. He feels the machine is working very well for him.  He does not snore with ASV. He is comfortable with the pressures. He uses a nasal mask (probably Mirage FX). He denies mask leak. He gets new supplies every 6 months. He denies dry nose or mouth. His weight is about 9# higher than his weight in 2018.    His machine may be part of the recall. He has not noticed any symptoms associated with the recall and he has not used SoClean.     He is on ASV with pressures: Epap min 5 cm, max 15. Pressure support min 0, max 10 with a max pressure of 15 cm.   DME: [facility]      The compliance data shows that the patient used the ASV for 30/30 nights, 100% of nights for >4 hours.  The 90th% pressure is 13.5/11 cm, avg 9.7/8.5 cm.  The average time in large leak is 8 sec.  The average nightly usage is 6:19.  The average AHI is 3.6/hr.      Past Sleep Evaluations: His sleep study on 11/25/2013 showed an AHI of 94/hr (143 of 211 events were mixed or central apneas), RDI 95/hr and O2 cyndy of 77.6%. He spent 28.9 min below 90% SpO2. His sleep was extremely fragmented and was subjectively much worse than at home.    SLEEP-WAKE SCHEDULE:     Work/School Days: Patient goes to school/work: Yes   Usually gets into bed at 10:00 PM  Takes patient about half hour or less to fall asleep  Has trouble falling asleep seldom nights per week  Wakes up in the middle of the night 1 times.  Wakes up due to Use the bathroom  He has trouble falling back asleep 0 times a week.   It usually takes 30 min to get back to sleep  Patient is usually up at 6:00  Uses alarm: No    Weekends/Non-work Days/All Other Days:  Usually gets into bed at 10:00   Takes patient  about 30 min to fall asleep  Patient is usually up at 6:00  Uses alarm: No    Sleep Need  Patient gets  7 hours sleep on average   Patient thinks he needs about 7 hours sleep    Adryan Govea prefers to sleep in this position(s):   Side mostly  Patient states they do the following activities in bed:  No TV in bed. Rarely uses iPad but falls asleep right away if he does.    Naps  Patient takes a purposeful nap 0  times a week  He feels better after a nap:    He dozes off unintentionally 0  days per week  Patient has had a driving accident or near-miss due to sleepiness/drowsiness:  no      SLEEP DISRUPTIONS:    Breathing/Snoring  Patient snores: not with ASV  Other people complain about his snoring:  no  Patient has been told he stops breathing in his sleep:  Not with ASV  He has issues with the following:  No morning headaches or nocturnal reflux    Movement:  Patient gets pain, discomfort, with an urge to move:   No restlessness in legs. Does have to get up and stretch his back occ, has some stenosis  It happens when he is resting:   n/a  It happens more at night:   n/a  Patient has been told he kicks his legs at night:   no     Behaviors in Sleep:  Adryan Govea has experienced the following behaviors while sleeping:    Pt denies bruxism, sleep talking, sleep walking, and dream enactment behavior. Pt denies sleep paralysis, hypnagogue and cataplexy.     Is there anything else you would like your sleep provider to know:  no    CAFFEINE AND OTHER SUBSTANCES:    Patient consumes caffeinated beverages per day:   2-3 cups of coffee  Last caffeine use is usually:  Before noon  List of any prescribed or over the counter stimulants that patient takes:  none  List of any prescribed or over the counter sleep medication patient takes:  Advil PM about once a month if back is bothering him  List of previous sleep medications that patient has tried:  none  Patient drinks alcohol to help them sleep:  no  Patient drinks alcohol  near bedtime:  no    Family History:  Patient has a family member been diagnosed with a sleep disorder:    Father has RD    SCALES:    EPWORTH SLEEPINESS SCALE      [place] Sleepiness Scale ( KALPANA Torres  [id number]<br>ESS - [place]/English - Final version - 21 Nov 07 - [facility] [place].) 7/12/2022   Sitting and reading Moderate chance of dozing   Watching TV Moderate chance of dozing   Sitting, inactive in a public place (e.g. a theatre or a meeting) Would never doze   As a passenger in a car for an hour without a break Would never doze   Lying down to rest in the afternoon when circumstances permit Moderate chance of dozing   Sitting and talking to someone Would never doze   Sitting quietly after a lunch without alcohol Would never doze   In a car, while stopped for a few minutes in traffic Would never doze   [place] Score (MC) 6   [place] Score (Sleep) 6         INSOMNIA SEVERITY INDEX (LOREN)      Insomnia Severity Index (LOREN) 7/12/2022   Difficulty falling asleep 0   Difficulty staying asleep 0   Problems waking up too early 1   How SATISFIED/DISSATISFIED are you with your CURRENT sleep pattern? 1   How NOTICEABLE to others do you think your sleep problem is in terms of impairing the quality of your life? 0   How WORRIED/DISTRESSED are you about your current sleep problem? 0   To what extent do you consider your sleep problem to INTERFERE with your daily functioning (e.g. daytime fatigue, mood, ability to function at work/daily chores, concentration, memory, mood, etc.) CURRENTLY? 0   LOREN Total Score 2       Guidelines for Scoring/Interpretation:  Total score categories:  0-7 = No clinically significant insomnia   8-14 = Subthreshold insomnia   15-21 = Clinical insomnia (moderate severity)  22-28 = Clinical insomnia (severe)  Used via courtesy of www.myhealth.va.gov with permission from Ovidio Ohara PhD., [facility]      STOP BANG     STOP BANG Questionnaire (  2008, the American Society of  Anesthesiologists, Inc. [employer] 7/12/2022   Neck Cir (cm) Clinic: -   B/P Clinic: 146/78   BMI Clinic: 40.57           Allergies:    No Known Allergies    Medications:    Current Outpatient Medications   Medication Sig Dispense Refill     atorvastatin (LIPITOR) 20 MG tablet Take 1 tablet (20 mg) by mouth daily 90 tablet 1     metFORMIN (GLUCOPHAGE) 500 MG tablet Take 1 tablet (500 mg) by mouth 2 times daily (with meals) 180 tablet 0       Problem List:  Patient Active Problem List    Diagnosis Date Noted     Morbid obesity (H) 06/21/2022     Priority: Medium     History of basal cell carcinoma 01/29/2016     Priority: Medium     Skin, left posterior superior shoulder, excision  - Basal cell carcinoma, completely excised 1/20/2016       Family history of melanoma 01/19/2016     Priority: Medium     Family history of nonmelanoma skin cancer 01/19/2016     Priority: Medium     History of colonic polyps 01/27/2015     Priority: Medium     Problem list name updated by automated process. Provider to review       Complex sleep apnea syndrome 01/20/2014     Priority: Medium     Snoring 10/30/2013     Priority: Medium     Presbyacusis 10/30/2013     Priority: Medium     Obesity 09/09/2013     Priority: Medium     Erectile dysfunction      Priority: Medium     Hyperlipidemia with target LDL less than 100      Priority: Medium     Diagnosis updated by automated process. Provider to review and confirm.          Past Medical/Surgical History:  Past Medical History:   Diagnosis Date     Abdominal aortic aneurysm (AAA) without rupture (H)      Basal cell carcinoma      Colon polyps     Sees Dr. Fu. Has annual colonoscopies     Erectile dysfunction      Hyperlipidemia LDL goal < 100      RD (obstructive sleep apnea)      Past Surgical History:   Procedure Laterality Date     GENITOURINARY SURGERY      vasectomy     ORTHOPEDIC SURGERY      left forearm reconstruction     SOFT TISSUE SURGERY  2016     basal cell shoulder       Social History:  Social History     Socioeconomic History     Marital status:      Spouse name: Not on file     Number of children: Not on file     Years of education: Not on file     Highest education level: Not on file   Occupational History     Not on file   Tobacco Use     Smoking status: Light Tobacco Smoker     Types: Cigars, Cigars, cigarillos or filtered cigars     Smokeless tobacco: Never Used   Substance and Sexual Activity     Alcohol use: Yes     Comment: couple times per week, 2 drinks     Drug use: No     Sexual activity: Yes     Partners: Female     Birth control/protection: Male Surgical   Other Topics Concern     Parent/sibling w/ CABG, MI or angioplasty before 65F 55M? 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  He is remarried (Cassandra), two kids age 35 and 32.            MEDICATIONS:  Not currently taking any medications. Was prescribed Simvistatin previously.            HEALTHCARE MAINTENANCE:      Physical activity he golfs, bikes, and sails. At the gym he will bike for 20 minutes, stretch, and some weight machines. He also downhill skis frequently. He wears a fit bit and walks around 4,000 steps a day. Exercise limited by his RIGHT hip pain. Walking is worse for his hip than a bicycle.          He smokes a cigar 3x per week. He stopped smoking cigarettes years ago.          Adryan has had 40-50 colonic polyps removed. He sees Dr. Fu at [facility] in [place]. His last colonoscopy was a few months ago. He has annual colonoscopies.          Immunizations are up to date, denies flu vaccine today. Tdap due in 2026.      Social Determinants of Health     Financial Resource Strain: Not on file   Food Insecurity: Not on file   Transportation Needs: Not on file   Physical Activity: Not on file   Stress: Not on file   Social Connections: Not on file   Intimate Partner  "Violence: Not on file   Housing Stability: Not on file       Family History:  Family History   Problem Relation Age of Onset     Diabetes Mother      Dementia Mother      Sleep Apnea Father      Substance Abuse Brother         alcohol     Cancer Sister 42        melanoma     Melanoma Sister        Review of Systems:  A complete review of systems reviewed by me is negative with the exeption of what has been mentioned in the history of present illness.    Review Of Systems  General: Negative for fever, chills, night sweats  Ears/Nose/Throat: negative for nasal congestion, rhinorrhea, earache, postnasal drainage, epistaxis, sinus trouble, sore throat  Respiratory: No shortness of breath, dyspnea on exertion, cough, wheeze or hemoptysis  Cardiovascular: negative for, tachycardia, irregular heart beat, chest pain, orthopnea and lower extremity edema  Genitourinary: negative for nocturia more than once a night  Musculoskeletal: positive for back pain, knee pain  Neurologic: positive for numbness or tingling of right leg. Negative for headaches  Psychiatric: negative for, anxiety and depression      Physical Examination:  Vitals: BP (!) 146/78   Ht 1.807 m (5' 11.14\")   Wt 132.5 kg (292 lb)   BMI 40.57 kg/m    BMI= Body mass index is 40.57 kg/m .           GENERAL APPEARANCE: healthy, alert, no distress and cooperative  EYES: Eyes grossly normal to inspection and wearing glasses  HENT: oropharynx crowded and tongue base enlarged  NECK: no asymmetry, masses, or scars  RESP: no respiratory distress, cough or wheeze  Mallampati Class: IV.  Tonsillar Stage: not observed.         Data: All pertinent previous laboratory data reviewed     Recent Labs   Lab Test 06/21/22  1200 07/06/20  1039    138   POTASSIUM 4.7* 4.4   CHLORIDE 99 106   CO2 28 19*   ANIONGAP 9 13   * 107*   BUN 15.8 11   CR 1.09 1.21   DRAKE 9.7 9.1       Recent Labs   Lab Test 10/06/16  1437   WBC 6.6   RBC 4.80   HGB 16.0   HCT 46.3   MCV 97 "   MCH 33.3*   MCHC 34.6   RDW 13.3          Recent Labs   Lab Test 06/21/22  1200   PROTTOTAL 7.0   ALBUMIN 4.3   BILITOTAL 0.5   ALKPHOS 70   AST 37   ALT 60*       No results found for: TSH    No results found for: UAMP, UBARB, BENZODIAZEUR, UCANN, UCOC, OPIT, UPCP    No results found for: IRONSAT, SL09053, LION    No results found for: PH, PHARTERIAL, PO2, [id number], SAT, PCO2, HCO3, BASEEXCESS, MICHEAL, BEB    @LABRCNTIPR(phv:4,pco2v:4,po2v:4,hco3v:4,sarah:4,o2per:4)@    Echocardiology: No results found for this or any previous visit (from the past 4320 hour(s)).    Chest x-ray: No results found for this or any previous visit from the past 365 days.      Chest CT: CT Chest Lung Cancer Scrn Low Dose wo 06/28/2022    Narrative  CT Low Dose Lung Cancer Screening    History:  Lung cancer screening; No chest CT for lung cancer screening  in the last year; 50-80 years; >= 20 pack-year smoking history; Former  smoker; Smoking quit date within the last 15 years; Smoking history  Screening for lung cancer, smoking.    Number of packs-year of smoking: greater than 20  Current or former smoker?: Former  If former, number of years since quit?: 15    Comparison: None, baseline    Technique: Helical acquisition low dose CT chest. Images reviewed in  lung, soft tissue and bone windows.  DLP: 172 (mGy*cm)  CTDIvol: 4.7 (mGy)    Findings:    Nodules:  2 mm solid nodule in the posterior right upper lobe on series 4 image  120.    4 mm solid fissural nodule in the right middle lobe on series 4 image  145.    2 mm solid nodule in the superior lingula on series 4 image 164.    Emphysema: None    Coronary artery calcium: heavy    Additional findings:    Mediastinum: No cardiomegaly. No pericardial effusion. Normal caliber  thoracic aorta and main pulmonary trunk. Heavy coronary artery  calcifications. No suspicious lymphadenopathy in the chest.    Lungs/pleura: No effusions or consolidations. Central airway is  patent.    Upper  "abdomen: Evaluation of the upper abdomen is limited. No hiatal  hernia.    Bones/soft tissues: No acute or suspicious osseous or soft tissue  abnormalities.    Impression  Impression:  1. ACR Assessment Category (v1.1):  Lung-RADS Category 2. Benign  appearance or behavior.    Recommendation:  Lung-RADS Category 2. Benign appearance or behavior.  Recommendation:  continue annual screening with Lung cancer screening  CT (please order exam code [id number]).      2. Significant Incidental Finding(s):  Category S: Yes.  a.  coronary artery calcium moderate or severe    3. Avoidance of tobacco smoke is strongly advised. Please consider  referral for smoking cessation to [facility] Medication Therapy Management  (MTM) if clinically appropriate.      Download the \"LungRADS v.1.1 Assessment Categories\" table at this  site:  [email or web address]  [email or web address]?la=en    I have personally reviewed the examination and initial interpretation  and I agree with the findings.    ARMIDA CLEARY MD      SYSTEM ID:  [id number]      PFT: Most Recent [facility] Pulmonary Function Testing    No results found for: 20001      Bennett Ezra Goltz, PA-C, RACH 7/12/2022          "

## 2022-07-12 ENCOUNTER — VIRTUAL VISIT (OUTPATIENT)
Dept: SLEEP MEDICINE | Facility: CLINIC | Age: 67
End: 2022-07-12
Payer: MEDICARE

## 2022-07-12 VITALS
HEIGHT: 71 IN | BODY MASS INDEX: 40.88 KG/M2 | SYSTOLIC BLOOD PRESSURE: 146 MMHG | WEIGHT: 292 LBS | DIASTOLIC BLOOD PRESSURE: 78 MMHG

## 2022-07-12 DIAGNOSIS — G47.39 COMPLEX SLEEP APNEA SYNDROME: Primary | ICD-10-CM

## 2022-07-12 PROCEDURE — 99203 OFFICE O/P NEW LOW 30 MIN: CPT | Mod: 95 | Performed by: PHYSICIAN ASSISTANT

## 2022-07-12 ASSESSMENT — SLEEP AND FATIGUE QUESTIONNAIRES
HOW LIKELY ARE YOU TO NOD OFF OR FALL ASLEEP IN A CAR, WHILE STOPPED FOR A FEW MINUTES IN TRAFFIC: WOULD NEVER DOZE
HOW LIKELY ARE YOU TO NOD OFF OR FALL ASLEEP WHILE SITTING AND TALKING TO SOMEONE: WOULD NEVER DOZE
HOW LIKELY ARE YOU TO NOD OFF OR FALL ASLEEP WHILE SITTING AND READING: MODERATE CHANCE OF DOZING
HOW LIKELY ARE YOU TO NOD OFF OR FALL ASLEEP WHILE SITTING INACTIVE IN A PUBLIC PLACE: WOULD NEVER DOZE
HOW LIKELY ARE YOU TO NOD OFF OR FALL ASLEEP WHILE SITTING QUIETLY AFTER LUNCH WITHOUT ALCOHOL: WOULD NEVER DOZE
HOW LIKELY ARE YOU TO NOD OFF OR FALL ASLEEP WHILE LYING DOWN TO REST IN THE AFTERNOON WHEN CIRCUMSTANCES PERMIT: MODERATE CHANCE OF DOZING
HOW LIKELY ARE YOU TO NOD OFF OR FALL ASLEEP WHILE WATCHING TV: MODERATE CHANCE OF DOZING
HOW LIKELY ARE YOU TO NOD OFF OR FALL ASLEEP WHEN YOU ARE A PASSENGER IN A CAR FOR AN HOUR WITHOUT A BREAK: WOULD NEVER DOZE

## 2022-07-12 NOTE — PATIENT INSTRUCTIONS
Your sleep apnea treatment may be affected by device recall    Our records show that you may have a Jan Respironics CPAP for the treatment of sleep apnea. Many of these devices have been recalled* by the  for replacement. [facility] recommends:     1) If you are using a Resmed device, continue using the device.  2) If you have a Jan Respironics device, register your device for confirmation of type of device and repair of the device at [email or web address]-update -if you cannot use link, call [phone].  The website will assist you in obtaining the serial number for registration.   3) If you are using a Jan Respironics CPAP or Bilevel PAP device and you do not have immediate breathing, driving or cardiovascular risks without the device, consider stopping use of the device after verification that is has been recalled. Discuss this decision with your medical provider if you are uncertain about your medical risks.  4) If you are not using Respironics CPAP but are using a Respironics advanced device for breathing support (AVAPS, ASV, Bilevel PAP), continue using the device and review 5 and 6 below).     5) If you continue the device, do not include ozone generating  connected to PAP devices.  6) Bacterial filters to reduce exposure to particulates are sometimes cumbersome to use and are not currently recommended by the .    ?       You may also choose discuss with your provider alternative approaches to treatment.      *[employer] is voluntarily recalling the below devices due to two (2) issues related to the polyester-based polyurethane (PE-PUR) sound abatement foam used in Jan Continuous and Non-Continuous Ventilators: 1) PE-PUR foam may degrade into particles which may enter the device's the air pathway and be ingested or inhaled by the user, and 2) the PE-PUR foam may off-gas certain chemicals.  The foam degradation may be exacerbated by use of unapproved cleaning methods, such as ozone (see FDA safety communication on use of Ozone ), and off-gassing may occur during initial operation and may possibly continue throughout the device's useful life.   These issues can result in serious injury which can be life-threatening, cause permanent impairment, and/or require medical intervention to preclude permanent impairment. To date, "[employer]"s has received several complaints regarding the presence of black debris/particles within the airpath circuit (extending from the device outlet, humidifier, tubing, and mask). Jan also has received reports of headache, upper airway irritation, cough, chest pressure and sinus infection. The potential risks of particulate exposure include: Irritation (skin, eye, and respiratory tract), inflammatory response, headache, asthma, adverse effects to other organs (e.g. kidneys and liver) and toxic carcinogenic affects. The potential risks of chemical exposure due to off-gassing include: headache/dizziness, irritation (eyes, nose, respiratory tract, skin), hypersensitivity, nausea/vomiting, toxic and carcinogenic effects. There have been no reports of death as a result of these issues.    Actions to be taken:  Discontinue the use of your device.  Do not continue to use ozone  with the device.     [place] affected devices on the recall website, www.[employer].com/SRC-update    i. The website provides current information on the status of the recall and how  to receive permanent corrective action to address the two issues.    ii. The website also provides instructions on how to locate an affected device  Serial Number and will guide users through the registration process.    iii. In the , call [phone] Service Hotline if you cannot visit the website

## 2022-07-12 NOTE — PROGRESS NOTES
Adryan is a 67 year old who is being evaluated via a billable video visit.      How would you like to obtain your AVS? MyChart  If the video visit is dropped, the invitation should be resent by: Send to e-mail at: [email or web address]  Will anyone else be joining your video visit? No        Video-Visit Details    Video Start Time: 9:01 AM    Type of service:  Video Visit    Video End Time:9:35 AM    Originating Location (pt. Location): Home    Distant Location (provider location):  [facility] SLEEP [facility]     Platform used for Video Visit: [employer]ev [employer]

## 2022-09-07 DIAGNOSIS — B35.1 ONYCHOMYCOSIS: ICD-10-CM

## 2022-09-07 RX ORDER — CICLOPIROX 80 MG/ML
SOLUTION TOPICAL
Qty: 6 ML | Refills: 2 | Status: SHIPPED | OUTPATIENT
Start: 2022-09-07

## 2022-09-12 DIAGNOSIS — Z91.89 AT RISK FOR DIABETES MELLITUS: ICD-10-CM

## 2022-09-12 NOTE — TELEPHONE ENCOUNTER
Medication requested: metFORMIN (GLUCOPHAGE) 500 MG tablet  Last office visit: 6/22/2022  [facility] appointments: none  Medication last refilled: 6/22/2022; 180 + 0 refills  Last qualifying labs:     Component      Latest Ref Rng & Units 6/21/2022   Hemoglobin A1C      0.0 - 5.6 % 6.1 (H)     Component      Latest Ref Rng & Units 6/21/2022   GFR Estimate      >60 mL/min/1.73m2 74     Component      Latest Ref Rng & Units 6/21/2022   Creatinine      0.67 - 1.17 mg/dL 1.09     Prescription approved per [facility] Refill Protocol.    LETI Castillo, RN  09/12/22, 4:09 PM

## 2022-10-15 ENCOUNTER — HEALTH MAINTENANCE LETTER (OUTPATIENT)
Age: 67
End: 2022-10-15

## 2022-12-19 DIAGNOSIS — E78.5 HYPERLIPIDEMIA WITH TARGET LDL LESS THAN 100: ICD-10-CM

## 2022-12-19 RX ORDER — ATORVASTATIN CALCIUM 20 MG/1
TABLET, FILM COATED ORAL
Qty: 90 TABLET | Refills: 0 | Status: SHIPPED | OUTPATIENT
Start: 2022-12-19 | End: 2023-04-04

## 2022-12-19 NOTE — TELEPHONE ENCOUNTER
Last visit 6/22/22, no future visit   Prescription approved per [facility] Refill Protocol.  Refilling x 1 - pt was due to come in for repeat lipids and Hgb A1c a few months ago - Mychart message to pt to make lab appt - orders already in the chart.  Shira Boucher RN  [facility]

## 2022-12-26 DIAGNOSIS — Z91.89 AT RISK FOR DIABETES MELLITUS: ICD-10-CM

## 2022-12-27 NOTE — TELEPHONE ENCOUNTER
Medication requested: metFORMIN (GLUCOPHAGE) 500 MG tablet  Last office visit: 6/22/22  [facility] appointments: none  Medication last refilled: 9/12/22; 180 + 0 refills  Last qualifying labs:   Component      Latest Ref Rng & Units 6/21/2022   Hemoglobin A1C      0.0 - 5.6 % 6.1 (H)     Component      Latest Ref Rng & Units 6/21/2022   Creatinine      0.67 - 1.17 mg/dL 1.09     Prescription approved per [facility] Refill Protocol.    Pt due for repeat labs, sent message requesting that he schedule an appointment.    Kana LANDEROS, RN  12/27/22 2:22 PM

## 2023-02-02 ENCOUNTER — OFFICE VISIT (OUTPATIENT)
Dept: ORTHOPEDICS | Facility: CLINIC | Age: 68
End: 2023-02-02
Payer: MEDICARE

## 2023-02-02 ENCOUNTER — ANCILLARY PROCEDURE (OUTPATIENT)
Dept: GENERAL RADIOLOGY | Facility: CLINIC | Age: 68
End: 2023-02-02
Attending: PREVENTIVE MEDICINE
Payer: MEDICARE

## 2023-02-02 DIAGNOSIS — M17.11 ARTHRITIS OF RIGHT KNEE: ICD-10-CM

## 2023-02-02 DIAGNOSIS — M25.561 RIGHT KNEE PAIN: ICD-10-CM

## 2023-02-02 DIAGNOSIS — M51.26 LUMBAR HERNIATED DISC: ICD-10-CM

## 2023-02-02 DIAGNOSIS — M25.561 ACUTE PAIN OF RIGHT KNEE: Primary | ICD-10-CM

## 2023-02-02 PROCEDURE — 99214 OFFICE O/P EST MOD 30 MIN: CPT | Mod: 25 | Performed by: PREVENTIVE MEDICINE

## 2023-02-02 PROCEDURE — 20610 DRAIN/INJ JOINT/BURSA W/O US: CPT | Mod: RT | Performed by: PREVENTIVE MEDICINE

## 2023-02-02 PROCEDURE — 73562 X-RAY EXAM OF KNEE 3: CPT | Mod: RT | Performed by: RADIOLOGY

## 2023-02-02 RX ORDER — DICLOFENAC SODIUM 75 MG/1
75 TABLET, DELAYED RELEASE ORAL 2 TIMES DAILY PRN
Qty: 40 TABLET | Refills: 1 | Status: SHIPPED | OUTPATIENT
Start: 2023-02-02

## 2023-02-02 RX ORDER — TRIAMCINOLONE ACETONIDE 40 MG/ML
40 INJECTION, SUSPENSION INTRA-ARTICULAR; INTRAMUSCULAR
Status: SHIPPED | OUTPATIENT
Start: 2023-02-02

## 2023-02-02 RX ADMIN — TRIAMCINOLONE ACETONIDE 40 MG: 40 INJECTION, SUSPENSION INTRA-ARTICULAR; INTRAMUSCULAR at 08:55

## 2023-02-02 NOTE — LETTER
2/2/2023         RE: Adryan Govea  [address] [facility] [id number]        Dear Colleague,    Thank you for referring your patient, Adryan Govea, to the [facility] [place]. Please see a copy of my visit note below.    HISTORY OF PRESENT ILLNESS  Mr. Govea is a pleasant 68 year old year old male who presents to clinic today with   Adryan explains that yesterday he was pushing a cart and when he went to take a step he was unable to put any weight on his right good leg. Has been using crutches for the last 24 hours as any walking causes the knee to buckle. Does not remember any pop or swelling associated with the injury. His pain is a 6/10 at worst and is an achy/burning sensation around the anterior and posterior side of the right knee.  Ibuprofen did not help with his discomfort. Hasn't had any buckling of the knee prior but endorses bilateral osteoarthritis of the knee as well as spinal stenosis of the L4/L5 with bulging disc and bilateral numbness and tingling in the feet.   Has had previous injections for lumbar spine      Location: right knee and some chronic low back pain    Quality:  achy pain    Severity: 6/10 at worst    MEDICAL HISTORY  Patient Active Problem List   Diagnosis     Erectile dysfunction     Hyperlipidemia with target LDL less than 100     Obesity     Snoring     Presbyacusis     Complex sleep apnea syndrome     History of colonic polyps     Family history of melanoma     Family history of nonmelanoma skin cancer     History of basal cell carcinoma     Morbid obesity (H)       Current Outpatient Medications   Medication Sig Dispense Refill     atorvastatin (LIPITOR) 20 MG tablet TAKE 1 TABLET(20 MG) BY MOUTH DAILY 90 tablet 0     ciclopirox (PENLAC) 8 % external solution Apply to  affected nails daily.  Remove with alcohol every 7 days, then repeat. 6 mL 2     metFORMIN (GLUCOPHAGE) 500 MG tablet TAKE 1 TABLET(500 MG) BY MOUTH TWICE DAILY WITH MEALS  180 tablet 0       No Known Allergies    Family History   Problem Relation Age of Onset     Diabetes Mother      Dementia Mother      Sleep Apnea Father      Substance Abuse Brother         alcohol     Cancer Sister 42        melanoma     Melanoma Sister      Social History     Socioeconomic History     Marital status:    Tobacco Use     Smoking status: Light Smoker     Types: Cigars, Cigars, cigarillos or filtered cigars     Smokeless tobacco: Never   Substance and Sexual Activity     Alcohol use: Yes     Comment: couple times per week, 2 drinks     Drug use: No     Sexual activity: Yes     Partners: Female     Birth control/protection: Male Surgical   Other Topics Concern     Parent/sibling w/ CABG, MI or angioplasty before 65F 55M? 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  He is remarried (Cassandra), two kids age 35 and 32.            MEDICATIONS:  Not currently taking any medications. Was prescribed Simvistatin previously.            HEALTHCARE MAINTENANCE:      Physical activity he golfs, bikes, and sails. At the gym he will bike for 20 minutes, stretch, and some weight machines. He also downhill skis frequently. He wears a fit bit and walks around 4,000 steps a day. Exercise limited by his RIGHT hip pain. Walking is worse for his hip than a bicycle.          He smokes a cigar 3x per week. He stopped smoking cigarettes years ago.          Adryan has had 40-50 colonic polyps removed. He sees Dr. Fu at [facility] in [place]. His last colonoscopy was a few months ago. He has annual colonoscopies.          Immunizations are up to date, denies flu vaccine today. Tdap due in 2026.        Additional medical/Social/Surgical histories reviewed in [facility] and updated as appropriate.     REVIEW OF SYSTEMS (2/2/2023)  10 point ROS of systems including Constitutional, Eyes, Respiratory, Cardiovascular,  Gastroenterology, Genitourinary, Integumentary, Musculoskeletal, Psychiatric, Allergic/Immunologic were all negative except for pertinent positives noted in my HPI.     PHYSICAL EXAM  VSS    General  - normal appearance, in no obvious distress  HEENT  - conjunctivae not injected, moist mucous membranes, normocephalic/atraumatic head, ears normal appearance, no lesions, mouth normal appearance, no scars, normal dentition and teeth present  CV  - normal popliteal pulse  Pulm  - normal respiratory pattern, non-labored  Musculoskeletal - right knee  - stance: mildly antalgic gait, genu varum  - inspection: generalized swelling, trace effusion  - palpation: medial joint line tenderness, patella and patellar tendon non-tender, normal popliteal pulse  - ROM: 100 degrees flexion, 0 degrees extension, painful active ROM  - strength: 5/5 in flexion, 5/5 in extension  - neuro: no sensory or motor deficit  - special tests:  (-) Lachman  (-) anterior drawer  (-) posterior drawer  (-) pivot shift  (-) Rufino  (-) Thessaly  (-) varus at 0 and 30 degrees flexion  (-) valgus at 0 and 30 degrees flexion  (+) Benito s compression test  (-) patellar apprehension  Neuro  - no sensory or motor deficit, grossly normal coordination, normal muscle tone  Skin  - no ecchymosis, erythema, warmth, or induration, no obvious rash  Psych  - interactive, appropriate, normal mood and affect  Lumbar: has some discomfort with flexion/extension, and slightly positive SLR on right    ASSESSMENT & PLAN  Adryan is a 68 year old male presenting with 24 hours of right sided knee buckling/weakness and 6/10 achy pain on the anterior and posterior knee likely due to exacerbation of L4/L5 disc bulge and moderate osteoarthritis leading to L5 nerve dysfunction with associated muscle weakness.     Patient received right knee cortisone injection at today's visit to help alleviate symptoms.  Plant to have follow up MRI Lumbar spine to evaluate for worsening disc  bulge. Schedule follow up visit in 3 weeks and discuss possibility of epidural at that time depending on MRI results.    I independently reviewed the following imaging studies:  xrays knee: shows arthritis  After a 20 minute discussion and examination, we decided to perform a same day injection for diagnostic and therapeutic purposes for right knee  Given HEP  RX given for voltaren and tizanadine    Patient has been doing home exercise physical therapy program for this problem      Appropriate PPE was utilized for prevention of spread of Covid-19.  Kenneth Butler MD, [facility]  PROCEDURE:           Right  Knee joint injection   The patient was apprised of the risks and the benefits of the procedure and consented and a written consent was signed.   The patient s  KNEE was sterilely prepped with chloraprep.   40 mg of triamcinolone suspension was drawn up into a 5 mL syringe with 4 mL of 1% lidocaine  The patient was injected into the knee with a 1.5-inch 22-gauge needle at the_superiorlateral aspect of their knee joint  There were no complications. The patient tolerated the procedure well. There was minimal bleeding.   The patient was instructed to ice the knee upon leaving clinic and refrain from overuse over the next 3 days.   The patient was instructed to go to the emergency room with any usual pain, swelling, or redness occurred in the injected area.   The patient was given a followup appointment to evaluate response to the injection to their increased range of motion and reduction of pain.  Follow up PRN  Dr Kenneth Butler    Large Joint Injection/Arthocentesis: R knee joint    Date/Time: 2/2/2023 8:55 AM  Performed by: Kenneth Butler MD  Authorized by: Kenneth Butler MD     Indications:  Pain, osteoarthritis, diagnostic evaluation and joint swelling  Needle Size:  22 G  Guidance: landmark guided    Approach:  Superolateral  Location:  Knee      Medications:  40 mg triamcinolone 40 MG/ML  Outcome:   Tolerated well, no immediate complications  Procedure discussed: discussed risks, benefits, and alternatives    Consent Given by:  Patient  Timeout: timeout called immediately prior to procedure    Prep: patient was prepped and draped in usual sterile fashion              Again, thank you for allowing me to participate in the care of your patient.        Sincerely,        Kenneth Butler MD

## 2023-02-02 NOTE — PROGRESS NOTES
HISTORY OF PRESENT ILLNESS  Mr. Govea is a pleasant 68 year old year old male who presents to clinic today with   Adryan explains that yesterday he was pushing a cart and when he went to take a step he was unable to put any weight on his right good leg. Has been using crutches for the last 24 hours as any walking causes the knee to buckle. Does not remember any pop or swelling associated with the injury. His pain is a 6/10 at worst and is an achy/burning sensation around the anterior and posterior side of the right knee.  Ibuprofen did not help with his discomfort. Hasn't had any buckling of the knee prior but endorses bilateral osteoarthritis of the knee as well as spinal stenosis of the L4/L5 with bulging disc and bilateral numbness and tingling in the feet.   Has had previous injections for lumbar spine      Location: right knee and some chronic low back pain    Quality:  achy pain    Severity: 6/10 at worst    MEDICAL HISTORY  Patient Active Problem List   Diagnosis     Erectile dysfunction     Hyperlipidemia with target LDL less than 100     Obesity     Snoring     Presbyacusis     Complex sleep apnea syndrome     History of colonic polyps     Family history of melanoma     Family history of nonmelanoma skin cancer     History of basal cell carcinoma     Morbid obesity (H)       Current Outpatient Medications   Medication Sig Dispense Refill     atorvastatin (LIPITOR) 20 MG tablet TAKE 1 TABLET(20 MG) BY MOUTH DAILY 90 tablet 0     ciclopirox (PENLAC) 8 % external solution Apply to  affected nails daily.  Remove with alcohol every 7 days, then repeat. 6 mL 2     metFORMIN (GLUCOPHAGE) 500 MG tablet TAKE 1 TABLET(500 MG) BY MOUTH TWICE DAILY WITH MEALS 180 tablet 0       No Known Allergies    Family History   Problem Relation Age of Onset     Diabetes Mother      Dementia Mother      Sleep Apnea Father      Substance Abuse Brother         alcohol     Cancer Sister 42        melanoma     Melanoma Sister       Social History     Socioeconomic History     Marital status:    Tobacco Use     Smoking status: Light Smoker     Types: Cigars, Cigars, cigarillos or filtered cigars     Smokeless tobacco: Never   Substance and Sexual Activity     Alcohol use: Yes     Comment: couple times per week, 2 drinks     Drug use: No     Sexual activity: Yes     Partners: Female     Birth control/protection: Male Surgical   Other Topics Concern     Parent/sibling w/ CABG, MI or angioplasty before 65F 55M? 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  He is remarried (Cassandra), two kids age 35 and 32.            MEDICATIONS:  Not currently taking any medications. Was prescribed Simvistatin previously.            HEALTHCARE MAINTENANCE:      Physical activity he golfs, bikes, and sails. At the gym he will bike for 20 minutes, stretch, and some weight machines. He also downhill skis frequently. He wears a fit bit and walks around 4,000 steps a day. Exercise limited by his RIGHT hip pain. Walking is worse for his hip than a bicycle.          He smokes a cigar 3x per week. He stopped smoking cigarettes years ago.          Adryan has had 40-50 colonic polyps removed. He sees Dr. Fu at [facility] in [place]. His last colonoscopy was a few months ago. He has annual colonoscopies.          Immunizations are up to date, denies flu vaccine today. Tdap due in 2026.        Additional medical/Social/Surgical histories reviewed in [facility] and updated as appropriate.     REVIEW OF SYSTEMS (2/2/2023)  10 point ROS of systems including Constitutional, Eyes, Respiratory, Cardiovascular, Gastroenterology, Genitourinary, Integumentary, Musculoskeletal, Psychiatric, Allergic/Immunologic were all negative except for pertinent positives noted in my HPI.     PHYSICAL EXAM  VSS    General  - normal appearance, in no obvious distress  HEENT  - conjunctivae not  injected, moist mucous membranes, normocephalic/atraumatic head, ears normal appearance, no lesions, mouth normal appearance, no scars, normal dentition and teeth present  CV  - normal popliteal pulse  Pulm  - normal respiratory pattern, non-labored  Musculoskeletal - right knee  - stance: mildly antalgic gait, genu varum  - inspection: generalized swelling, trace effusion  - palpation: medial joint line tenderness, patella and patellar tendon non-tender, normal popliteal pulse  - ROM: 100 degrees flexion, 0 degrees extension, painful active ROM  - strength: 5/5 in flexion, 5/5 in extension  - neuro: no sensory or motor deficit  - special tests:  (-) Lachman  (-) anterior drawer  (-) posterior drawer  (-) pivot shift  (-) Rufino  (-) Thessaly  (-) varus at 0 and 30 degrees flexion  (-) valgus at 0 and 30 degrees flexion  (+) Benito s compression test  (-) patellar apprehension  Neuro  - no sensory or motor deficit, grossly normal coordination, normal muscle tone  Skin  - no ecchymosis, erythema, warmth, or induration, no obvious rash  Psych  - interactive, appropriate, normal mood and affect  Lumbar: has some discomfort with flexion/extension, and slightly positive SLR on right    ASSESSMENT & PLAN  Adryan is a 68 year old male presenting with 24 hours of right sided knee buckling/weakness and 6/10 achy pain on the anterior and posterior knee likely due to exacerbation of L4/L5 disc bulge and moderate osteoarthritis leading to L5 nerve dysfunction with associated muscle weakness.     Patient received right knee cortisone injection at today's visit to help alleviate symptoms.  Plant to have follow up MRI Lumbar spine to evaluate for worsening disc bulge. Schedule follow up visit in 3 weeks and discuss possibility of epidural at that time depending on MRI results.    I independently reviewed the following imaging studies:  xrays knee: shows arthritis  After a 20 minute discussion and examination, we decided to perform  a same day injection for diagnostic and therapeutic purposes for right knee  Given HEP  RX given for voltaren and tizanadine    Patient has been doing home exercise physical therapy program for this problem      Appropriate PPE was utilized for prevention of spread of Covid-19.  Kenneth Butler MD, [facility]  PROCEDURE:           Right  Knee joint injection   The patient was apprised of the risks and the benefits of the procedure and consented and a written consent was signed.   The patient s  KNEE was sterilely prepped with chloraprep.   40 mg of triamcinolone suspension was drawn up into a 5 mL syringe with 4 mL of 1% lidocaine  The patient was injected into the knee with a 1.5-inch 22-gauge needle at the_superiorlateral aspect of their knee joint  There were no complications. The patient tolerated the procedure well. There was minimal bleeding.   The patient was instructed to ice the knee upon leaving clinic and refrain from overuse over the next 3 days.   The patient was instructed to go to the emergency room with any usual pain, swelling, or redness occurred in the injected area.   The patient was given a followup appointment to evaluate response to the injection to their increased range of motion and reduction of pain.  Follow up PRN  Dr Kenneth Butler    Large Joint Injection/Arthocentesis: R knee joint    Date/Time: 2/2/2023 8:55 AM  Performed by: Kenneth Butler MD  Authorized by: Kenneth Butler MD     Indications:  Pain, osteoarthritis, diagnostic evaluation and joint swelling  Needle Size:  22 G  Guidance: landmark guided    Approach:  Superolateral  Location:  Knee      Medications:  40 mg triamcinolone 40 MG/ML  Outcome:  Tolerated well, no immediate complications  Procedure discussed: discussed risks, benefits, and alternatives    Consent Given by:  Patient  Timeout: timeout called immediately prior to procedure    Prep: patient was prepped and draped in usual sterile fashion

## 2023-02-09 ENCOUNTER — TRANSFERRED RECORDS (OUTPATIENT)
Dept: HEALTH INFORMATION MANAGEMENT | Facility: CLINIC | Age: 68
End: 2023-02-09
Payer: MEDICARE

## 2023-02-14 ENCOUNTER — OFFICE VISIT (OUTPATIENT)
Dept: ORTHOPEDICS | Facility: CLINIC | Age: 68
End: 2023-02-14
Payer: MEDICARE

## 2023-02-14 ENCOUNTER — ANCILLARY PROCEDURE (OUTPATIENT)
Dept: MRI IMAGING | Facility: CLINIC | Age: 68
End: 2023-02-14
Attending: PREVENTIVE MEDICINE
Payer: MEDICARE

## 2023-02-14 DIAGNOSIS — M17.11 ARTHRITIS OF RIGHT KNEE: ICD-10-CM

## 2023-02-14 DIAGNOSIS — M51.369 DDD (DEGENERATIVE DISC DISEASE), LUMBAR: ICD-10-CM

## 2023-02-14 DIAGNOSIS — M51.26 LUMBAR HERNIATED DISC: Primary | ICD-10-CM

## 2023-02-14 DIAGNOSIS — M51.26 LUMBAR HERNIATED DISC: ICD-10-CM

## 2023-02-14 DIAGNOSIS — M51.16 INTERVERTEBRAL DISC DISORDERS WITH RADICULOPATHY, LUMBAR REGION: ICD-10-CM

## 2023-02-14 PROCEDURE — G1010 CDSM STANSON: HCPCS | Performed by: RADIOLOGY

## 2023-02-14 PROCEDURE — 72148 MRI LUMBAR SPINE W/O DYE: CPT | Mod: ME | Performed by: RADIOLOGY

## 2023-02-14 PROCEDURE — 99214 OFFICE O/P EST MOD 30 MIN: CPT | Performed by: PREVENTIVE MEDICINE

## 2023-02-14 NOTE — PROGRESS NOTES
HISTORY OF PRESENT ILLNESS  Mr. Govea is a pleasant 68 year old year old male who presents to clinic today with   Adryan explains that he is here following up after his right knee injection, states he has had relief.  His low back still bothers him and has some slight discomfort in the right knee but overall improved  Had lumbar MRI and would like to review and discuss a plan  Using voltaren PRN  Which helps somewhat    Location: right knee, lumbar pain    Quality:  achy pain    Severity: 7/10 at worst    Duration: see above  Timing: occurs intermittently  Context: occurs while exercising and lifting and using the joint  Modifying factors:  resting and non-use makes it better, movement and use makes it worse  Associated signs & symptoms: low back pain    MEDICAL HISTORY  Patient Active Problem List   Diagnosis     Erectile dysfunction     Hyperlipidemia with target LDL less than 100     Obesity     Snoring     Presbyacusis     Complex sleep apnea syndrome     History of colonic polyps     Family history of melanoma     Family history of nonmelanoma skin cancer     History of basal cell carcinoma     Morbid obesity (H)       Current Outpatient Medications   Medication Sig Dispense Refill     atorvastatin (LIPITOR) 20 MG tablet TAKE 1 TABLET(20 MG) BY MOUTH DAILY 90 tablet 0     ciclopirox (PENLAC) 8 % external solution Apply to  affected nails daily.  Remove with alcohol every 7 days, then repeat. 6 mL 2     diclofenac (VOLTAREN) 75 MG EC tablet Take 1 tablet (75 mg) by mouth 2 times daily as needed 40 tablet 1     metFORMIN (GLUCOPHAGE) 500 MG tablet TAKE 1 TABLET(500 MG) BY MOUTH TWICE DAILY WITH MEALS 180 tablet 0     tiZANidine (ZANAFLEX) 4 MG tablet Take 1-2 tablets (4-8 mg) by mouth nightly as needed for muscle spasms 60 tablet 1       No Known Allergies    Family History   Problem Relation Age of Onset     Diabetes Mother      Dementia Mother      Sleep Apnea Father      Substance Abuse Brother         alcohol      Cancer Sister 42        melanoma     Melanoma Sister      Social History     Socioeconomic History     Marital status:    Tobacco Use     Smoking status: Light Smoker     Types: Cigars, Cigars, cigarillos or filtered cigars     Smokeless tobacco: Never   Substance and Sexual Activity     Alcohol use: Yes     Comment: couple times per week, 2 drinks     Drug use: No     Sexual activity: Yes     Partners: Female     Birth control/protection: Male Surgical   Other Topics Concern     Parent/sibling w/ CABG, MI or angioplasty before 65F 55M? 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  He is remarried (Cassandra), two kids age 35 and 32.            MEDICATIONS:  Not currently taking any medications. Was prescribed Simvistatin previously.            HEALTHCARE MAINTENANCE:      Physical activity he golfs, bikes, and sails. At the gym he will bike for 20 minutes, stretch, and some weight machines. He also downhill skis frequently. He wears a fit bit and walks around 4,000 steps a day. Exercise limited by his RIGHT hip pain. Walking is worse for his hip than a bicycle.          He smokes a cigar 3x per week. He stopped smoking cigarettes years ago.          Adryan has had 40-50 colonic polyps removed. He sees Dr. Fu at [facility] in [place]. His last colonoscopy was a few months ago. He has annual colonoscopies.          Immunizations are up to date, denies flu vaccine today. Tdap due in 2026.        Additional medical/Social/Surgical histories reviewed in [facility] and updated as appropriate.     REVIEW OF SYSTEMS (2/14/2023)  10 point ROS of systems including Constitutional, Eyes, Respiratory, Cardiovascular, Gastroenterology, Genitourinary, Integumentary, Musculoskeletal, Psychiatric, Allergic/Immunologic were all negative except for pertinent positives noted in my HPI.     PHYSICAL EXAM  VSS  Vital Signs: There  were no vitals taken for this visit. Patient declined being weighed. There is no height or weight on file to calculate BMI.    General  - normal appearance, in no obvious distress  HEENT  - conjunctivae not injected, moist mucous membranes, normocephalic/atraumatic head, ears normal appearance, no lesions, mouth normal appearance, no scars, normal dentition and teeth present  CV  - normal peripheral perfusion  Pulm  - normal respiratory pattern, non-labored  Musculoskeletal - lumbar spine  - stance: normal gait without limp, no obvious leg length discrepancy, normal heel and toe walk  - inspection: normal bone and joint alignment, no obvious scoliosis  - palpation: no paravertebral or bony tenderness  - ROM: flexion exacerbates pain, normal extension, sidebending, rotation  - strength: lower extremities 5/5 in all planes  - special tests:  (+) straight leg raise  (+) slump test  Neuro  - patellar and Achilles DTRs 2+ bilaterally, bilateral lower extremity sensory deficit throughout L5 distribution, grossly normal coordination, normal muscle tone  Skin  - no ecchymosis, erythema, warmth, or induration, no obvious rash  Psych  - interactive, appropriate, normal mood and affect  Right knee: has some pain with patellar comrpession and with flexion, no effusion, no joint line pain today  ASSESSMENT & PLAN  69 yo male with right knee arthritis, improved, lumbar ddd, disc herniation and radicular pain not resolved    I independently reviewed the following imaging studies:  Lumbar MRI: shows ddd, disc herniation at L4/5  Discussed and ordered MARCO for L4/5 to have completed to treat radiculopathy that led to right knee and leg 'giving out and recurrent low back pain and numbness in bilateral feet  Cont. voltaren  Cont. HEP  Start PT  F/u after MARCO  Can consider HA injections for knee in the future  Patient has been doing home exercise physical therapy program for this problem      Appropriate PPE was utilized for prevention  of spread of Covid-19.  Kenneth Butler MD, [facility]

## 2023-02-14 NOTE — LETTER
2/14/2023         RE: Adryan Govea  [address] [facility] [address]        Dear Colleague,    Thank you for referring your patient, Adryan Govea, to the [facility] [place]. Please see a copy of my visit note below.    HISTORY OF PRESENT ILLNESS  Mr. Govea is a pleasant 68 year old year old male who presents to clinic today with   Adryan explains that he is here following up after his right knee injection, states he has had relief.  His low back still bothers him and has some slight discomfort in the right knee but overall improved  Had lumbar MRI and would like to review and discuss a plan  Using voltaren PRN  Which helps somewhat    Location: right knee, lumbar pain    Quality:  achy pain    Severity: 7/10 at worst    Duration: see above  Timing: occurs intermittently  Context: occurs while exercising and lifting and using the joint  Modifying factors:  resting and non-use makes it better, movement and use makes it worse  Associated signs & symptoms: low back pain    MEDICAL HISTORY  Patient Active Problem List   Diagnosis     Erectile dysfunction     Hyperlipidemia with target LDL less than 100     Obesity     Snoring     Presbyacusis     Complex sleep apnea syndrome     History of colonic polyps     Family history of melanoma     Family history of nonmelanoma skin cancer     History of basal cell carcinoma     Morbid obesity (H)       Current Outpatient Medications   Medication Sig Dispense Refill     atorvastatin (LIPITOR) 20 MG tablet TAKE 1 TABLET(20 MG) BY MOUTH DAILY 90 tablet 0     ciclopirox (PENLAC) 8 % external solution Apply to  affected nails daily.  Remove with alcohol every 7 days, then repeat. 6 mL 2     diclofenac (VOLTAREN) 75 MG EC tablet Take 1 tablet (75 mg) by mouth 2 times daily as needed 40 tablet 1     metFORMIN (GLUCOPHAGE) 500 MG tablet TAKE 1 TABLET(500 MG) BY MOUTH TWICE DAILY WITH MEALS 180 tablet 0     tiZANidine (ZANAFLEX) 4 MG tablet  Take 1-2 tablets (4-8 mg) by mouth nightly as needed for muscle spasms 60 tablet 1       No Known Allergies    Family History   Problem Relation Age of Onset     Diabetes Mother      Dementia Mother      Sleep Apnea Father      Substance Abuse Brother         alcohol     Cancer Sister 42        melanoma     Melanoma Sister      Social History     Socioeconomic History     Marital status:    Tobacco Use     Smoking status: Light Smoker     Types: Cigars, Cigars, cigarillos or filtered cigars     Smokeless tobacco: Never   Substance and Sexual Activity     Alcohol use: Yes     Comment: couple times per week, 2 drinks     Drug use: No     Sexual activity: Yes     Partners: Female     Birth control/protection: Male Surgical   Other Topics Concern     Parent/sibling w/ CABG, MI or angioplasty before 65F 55M? 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  He is remarried (Cassandra), two kids age 35 and 32.            MEDICATIONS:  Not currently taking any medications. Was prescribed Simvistatin previously.            HEALTHCARE MAINTENANCE:      Physical activity he golfs, bikes, and sails. At the gym he will bike for 20 minutes, stretch, and some weight machines. He also downhill skis frequently. He wears a fit bit and walks around 4,000 steps a day. Exercise limited by his RIGHT hip pain. Walking is worse for his hip than a bicycle.          He smokes a cigar 3x per week. He stopped smoking cigarettes years ago.          Adryan has had 40-50 colonic polyps removed. He sees Dr. Fu at [facility] in [place]. His last colonoscopy was a few months ago. He has annual colonoscopies.          Immunizations are up to date, denies flu vaccine today. Tdap due in 2026.        Additional medical/Social/Surgical histories reviewed in [facility] and updated as appropriate.     REVIEW OF SYSTEMS (2/14/2023)  10 point ROS of systems  including Constitutional, Eyes, Respiratory, Cardiovascular, Gastroenterology, Genitourinary, Integumentary, Musculoskeletal, Psychiatric, Allergic/Immunologic were all negative except for pertinent positives noted in my HPI.     PHYSICAL EXAM  VSS  Vital Signs: There were no vitals taken for this visit. Patient declined being weighed. There is no height or weight on file to calculate BMI.    General  - normal appearance, in no obvious distress  HEENT  - conjunctivae not injected, moist mucous membranes, normocephalic/atraumatic head, ears normal appearance, no lesions, mouth normal appearance, no scars, normal dentition and teeth present  CV  - normal peripheral perfusion  Pulm  - normal respiratory pattern, non-labored  Musculoskeletal - lumbar spine  - stance: normal gait without limp, no obvious leg length discrepancy, normal heel and toe walk  - inspection: normal bone and joint alignment, no obvious scoliosis  - palpation: no paravertebral or bony tenderness  - ROM: flexion exacerbates pain, normal extension, sidebending, rotation  - strength: lower extremities 5/5 in all planes  - special tests:  (+) straight leg raise  (+) slump test  Neuro  - patellar and Achilles DTRs 2+ bilaterally, bilateral lower extremity sensory deficit throughout L5 distribution, grossly normal coordination, normal muscle tone  Skin  - no ecchymosis, erythema, warmth, or induration, no obvious rash  Psych  - interactive, appropriate, normal mood and affect  Right knee: has some pain with patellar comrpession and with flexion, no effusion, no joint line pain today  ASSESSMENT & PLAN  67 yo male with right knee arthritis, improved, lumbar ddd, disc herniation and radicular pain not resolved    I independently reviewed the following imaging studies:  Lumbar MRI: shows ddd, disc herniation at L4/5  Discussed and ordered MRACO for L4/5 to have completed to treat radiculopathy that led to right knee and leg 'giving out and recurrent low back  pain and numbness in bilateral feet  Cont. voltaren  Cont. HEP  Start PT  F/u after MARCO  Can consider HA injections for knee in the future  Patient has been doing home exercise physical therapy program for this problem      Appropriate PPE was utilized for prevention of spread of Covid-19.  Kenneth Butler MD, CAQSM          Again, thank you for allowing me to participate in the care of your patient.        Sincerely,        Kenneth Butler MD

## 2023-02-15 ENCOUNTER — THERAPY VISIT (OUTPATIENT)
Dept: PHYSICAL THERAPY | Facility: CLINIC | Age: 68
End: 2023-02-15
Payer: MEDICARE

## 2023-02-15 DIAGNOSIS — M54.41 CHRONIC BILATERAL LOW BACK PAIN WITH RIGHT-SIDED SCIATICA: ICD-10-CM

## 2023-02-15 DIAGNOSIS — M17.11 ARTHRITIS OF RIGHT KNEE: ICD-10-CM

## 2023-02-15 DIAGNOSIS — M51.26 LUMBAR HERNIATED DISC: ICD-10-CM

## 2023-02-15 DIAGNOSIS — M25.561 ACUTE PAIN OF RIGHT KNEE: ICD-10-CM

## 2023-02-15 DIAGNOSIS — M51.369 DDD (DEGENERATIVE DISC DISEASE), LUMBAR: ICD-10-CM

## 2023-02-15 DIAGNOSIS — G89.29 CHRONIC BILATERAL LOW BACK PAIN WITH RIGHT-SIDED SCIATICA: ICD-10-CM

## 2023-02-15 DIAGNOSIS — M25.551 HIP PAIN, RIGHT: ICD-10-CM

## 2023-02-15 PROCEDURE — 97110 THERAPEUTIC EXERCISES: CPT | Mod: GP | Performed by: PHYSICAL THERAPIST

## 2023-02-15 PROCEDURE — 97161 PT EVAL LOW COMPLEX 20 MIN: CPT | Mod: GP | Performed by: PHYSICAL THERAPIST

## 2023-02-15 NOTE — PROGRESS NOTES
[facility]    OUTPATIENT Physical Therapy ORTHOPEDIC EVALUATION  PLAN OF TREATMENT FOR OUTPATIENT REHABILITATION  (COMPLETE FOR INITIAL CLAIMS ONLY)  Patient's Last Name, First Name, M.I.  YOB: 1955  Adryan Govea    Provider s Name:  [facility]   Medical Record No.  [id number]   Start of Care Date:  02/15/23   Onset Date:   02/14/23   Treatment Diagnosis:  right hip / knee pain secondary to low back sciatica Medical Diagnosis:     Lumbar herniated disc  DDD (degenerative disc disease), lumbar  Arthritis of right knee  Chronic bilateral low back pain with right-sided sciatica  Hip pain, right  Acute pain of right knee       Goals:     02/15/23 0500   Body Part   Goals listed below are for low back / right hip / right knee   Goal #1   Goal #1 ambulation   Previous Functional Level No restrictions   Current Functional Level Minutes patient can walk   Performance Level 10 min before having to sit   STG Target Performance Minutes patient will be able to walk   Performance Level 15 min before having to sit   Rationale for safe household ambulation;for safe community ambulation;to promote a healthy and active lifestyle   Due Date 03/15/23    LTG Target Performance Minutes patient will be able to  walk   Performance Level 20 min before having to sit   Rationale for safe household ambulation;for safe community ambulation;to promote a healthy and active lifestyle;to maintain proper body mechanics/posture while ambulating to avoid additional compensatory injury due to improper gait mechanics   Due Date 04/12/23         Therapy Frequency:  1 x/ week  Predicted Duration of Therapy Intervention:  8 weeks    Adan Tanner, PT                 I CERTIFY THE NEED FOR THESE SERVICES FURNISHED UNDER        THIS PLAN OF TREATMENT AND WHILE UNDER MY CARE     (Physician attestation of  this document indicates review and certification of the therapy plan).                     Certification Date From:  02/15/23   Certification Date To:  04/12/23    Referring Provider:  Kenneth Butler    Initial Assessment        See Epic Evaluation SOC Date: 02/15/23

## 2023-02-15 NOTE — PROGRESS NOTES
Physical Therapy Initial Evaluation  Subjective:  The history is provided by the patient. No  was used.   Therapist Generated HPI Evaluation  Problem details: Pt reports he started having a right knee issue where his knee started to give out him. He was given a cortisone injection which helped quite a bit with the pain still feels medial knee pain though. Now Dr. Butler thinks that his knee pain is possible secondary to some of the back issues he has been having. .         Type of problem:  Lumbar.    This is a chronic (chronic back pain. ) condition.  Condition occurred with:  Repetition/overuse (first notice it with playing a round of golf and his hip and butt would hurt after. ).  Where condition occurred: during recreation/sport.  Patient reports pain:  Lumbar spine right.  Pain is described as aching and is intermittent.  Pain radiates to:  Gluteals right, thigh right and knee right. Pain is the same all the time.  Since onset symptoms are unchanged.  Associated symptoms:  Loss of strength, loss of motion/stiffness and tingling (tingling bilateral top of feet). Exacerbated by: prolonged standing / walking, golf.  Relieved by: sitting and resting.  Special tests included:  MRI (L4-L5 disc herniation).  Past treatment: injection knee which helped substantially. There was moderate improvement following previous treatment.  Restrictions due to condition include:  Working in normal job without restrictions.  Barriers include:  None as reported by patient.    Patient Health History  Adryan Govea being seen for right leg / low back pain.     Problem began: 2/15/2023.   Problem occurred: unknown / activity.    Pain is reported as 4/10 on pain scale.  General health as reported by patient is good.  Pertinent medical history includes: overweight and osteoarthritis.   Red flags:  None as reported by patient.  Medical allergies: none.   Surgeries include:  None.     Other medications details:  cholesterol.    Current occupation is sales in software. .   Primary job tasks include:  Computer work and prolonged sitting.                                    Objective:  System         Lumbar/SI Evaluation    Lumbar Myotomes:    T12-L3 (Hip Flex):  Left: 5    Right: 5  L2-4 (Quads):  Left:  5    Right:  5  L4 (Ankle DF):  Left:  5    Right:  5  L5 (Great Toe Ext): Left: 5    Right: 3+   S1 (Toe Raise):  Left: 5    Right: 3+      Lumbar Dermtomes:  normal                                                            Hip Evaluation  Hip PROM:  Hip PROM:  Left Hip:    Normal  Right Hip:  Normal                          Hip Strength:    Flexion:   Left: 5/5    Pain: strong/pain free  Right: 4+/5    Pain: weak/painful                    Extension:  Left: 5/5   Pain:strong/pain freeRight: 5/5     Pain: strong/pain free    Abduction:  Left: 5/5      Pain:strong/pain freeRight: 4/5     Pain:weak/painful  Adduction:  Left: 5/5     Pain:strong/pain freeRight: 5/5    Pain:strong/pain free  Internal Rotation:  Left: 5/5     Pain:strong/pain freeRight: 5/5    Pain:strong/pain free  External Rotation:  Left: 5/5    Pain: strong/pain free  Right: 5/5    Pain: strong/pain free  Knee Flexion:  Left: 5/5    Pain:strong/pain freeRight: 5/5    Pain: strong/pain free  Knee Extension:  Left: 5/5    Pain:strong/pain freeRight: 5/5     Pain: strong/pain free                 Knee Evaluation:  ROM:  PROM: normal    AROM    Hyperextension:  Left:  2    Right: 2    Flexion: Left: 132    Right: 128        Strength:     Extension:  Left: 5/5   Strong/pain free  Pain:      Right: 5/5   Strong/pain free  Pain:  Flexion:  Left: 5/5   Strong/pain free  Pain:      Right: 5/5   Strong/pain free  Pain:    Quad Set Left: Good    Pain:   Quad Set Right: Good    Pain:  Ligament Testing:  Normal                  Palpation:      Right knee tenderness present at:  Medial Joint Line  Right knee tenderness not present at:  Biceps Femoral; Semitendinosus;  Gluteus Medius; Patellar Medial; Patellar Lateral and Patellar Superior              Luis Lumbar Evaluation      Movement Loss:  Flexion (Flex): min and pain  Extension (EXT): nil  Side Glide R (SG R): nil  Side Glide L (SG L): nil  Test Movements:  FIS: During: no effect  After: no effect    Repeat FIS: During: no effect  After: no effect    EIS: During: increases  After: no worse    Repeat EIS: During: increases  After: no worse  Mechanical Response: no effect            Conclusion: other                                         ROS    Assessment/Plan:    Patient is a 68 year old male with lumbar, right side hip and right side knee complaints.    Patient has the following significant findings with corresponding treatment plan.                Diagnosis 1:  Right hip and knee pain secondary to low back pain with right sciatica  Pain -  hot/cold therapy, US, manual therapy, self management, education, directional preference exercise and home program  Decreased ROM/flexibility - manual therapy, therapeutic exercise, therapeutic activity and home program  Decreased strength - therapeutic exercise, therapeutic activities and home program  Decreased function - therapeutic activities and home program    Therapy Evaluation Codes:   1) History comprised of:   Personal factors that impact the plan of care:      Time since onset of symptoms.    Comorbidity factors that impact the plan of care are:      Osteoarthritis and Overweight.     Medications impacting care: None.  2) Examination of Body Systems comprised of:   Body structures and functions that impact the plan of care:      Hip, Knee and Lumbar spine.   Activity limitations that impact the plan of care are:      Standing and Walking.  3) Clinical presentation characteristics are:   Stable/Uncomplicated.  4) Decision-Making    Low complexity using standardized patient assessment instrument and/or measureable assessment of functional outcome.  Cumulative Therapy  Evaluation is: Low complexity.    Previous and current functional limitations:  (See Goal Flow Sheet for this information)    Short term and Long term goals: (See Goal Flow Sheet for this information)     Communication ability:  Patient appears to be able to clearly communicate and understand verbal and written communication and follow directions correctly.  Treatment Explanation - The following has been discussed with the patient:   RX ordered/plan of care  Anticipated outcomes  Possible risks and side effects  This patient would benefit from PT intervention to resume normal activities.   Rehab potential is good.    Frequency:  1 X week, once daily  Duration:  for 8 weeks  Discharge Plan:  Achieve all LTG.  Independent in home treatment program.  Reach maximal therapeutic benefit.    Please refer to the daily flowsheet for treatment today, total treatment time and time spent performing 1:1 timed codes.

## 2023-02-18 ENCOUNTER — THERAPY VISIT (OUTPATIENT)
Dept: PHYSICAL THERAPY | Facility: CLINIC | Age: 68
End: 2023-02-18
Payer: MEDICARE

## 2023-02-18 DIAGNOSIS — M54.41 CHRONIC BILATERAL LOW BACK PAIN WITH RIGHT-SIDED SCIATICA: Primary | ICD-10-CM

## 2023-02-18 DIAGNOSIS — M25.561 ACUTE PAIN OF RIGHT KNEE: ICD-10-CM

## 2023-02-18 DIAGNOSIS — G89.29 CHRONIC BILATERAL LOW BACK PAIN WITH RIGHT-SIDED SCIATICA: Primary | ICD-10-CM

## 2023-02-18 DIAGNOSIS — M25.551 HIP PAIN, RIGHT: ICD-10-CM

## 2023-02-18 PROCEDURE — 97110 THERAPEUTIC EXERCISES: CPT | Mod: GP

## 2023-02-21 ENCOUNTER — THERAPY VISIT (OUTPATIENT)
Dept: PHYSICAL THERAPY | Facility: CLINIC | Age: 68
End: 2023-02-21
Payer: MEDICARE

## 2023-02-21 DIAGNOSIS — G89.29 CHRONIC BILATERAL LOW BACK PAIN WITH RIGHT-SIDED SCIATICA: Primary | ICD-10-CM

## 2023-02-21 DIAGNOSIS — M25.561 ACUTE PAIN OF RIGHT KNEE: ICD-10-CM

## 2023-02-21 DIAGNOSIS — M54.41 CHRONIC BILATERAL LOW BACK PAIN WITH RIGHT-SIDED SCIATICA: Primary | ICD-10-CM

## 2023-02-21 DIAGNOSIS — M25.551 HIP PAIN, RIGHT: ICD-10-CM

## 2023-02-21 PROCEDURE — 97110 THERAPEUTIC EXERCISES: CPT | Mod: GP | Performed by: PHYSICAL THERAPIST

## 2023-03-28 ENCOUNTER — ANCILLARY PROCEDURE (OUTPATIENT)
Dept: GENERAL RADIOLOGY | Facility: CLINIC | Age: 68
End: 2023-03-28
Attending: PREVENTIVE MEDICINE
Payer: MEDICARE

## 2023-03-28 DIAGNOSIS — M51.26 LUMBAR HERNIATED DISC: ICD-10-CM

## 2023-03-28 DIAGNOSIS — M51.369 DDD (DEGENERATIVE DISC DISEASE), LUMBAR: ICD-10-CM

## 2023-03-28 DIAGNOSIS — M51.16 INTERVERTEBRAL DISC DISORDERS WITH RADICULOPATHY, LUMBAR REGION: ICD-10-CM

## 2023-03-28 PROCEDURE — 62323 NJX INTERLAMINAR LMBR/SAC: CPT | Performed by: RADIOLOGY

## 2023-03-28 RX ORDER — BUPIVACAINE HYDROCHLORIDE 5 MG/ML
2 INJECTION, SOLUTION PERINEURAL ONCE
Status: COMPLETED | OUTPATIENT
Start: 2023-03-28 | End: 2023-03-28

## 2023-03-28 RX ORDER — IOPAMIDOL 408 MG/ML
2 INJECTION, SOLUTION INTRATHECAL ONCE
Status: COMPLETED | OUTPATIENT
Start: 2023-03-28 | End: 2023-03-28

## 2023-03-28 RX ORDER — METHYLPREDNISOLONE ACETATE 80 MG/ML
80 INJECTION, SUSPENSION INTRA-ARTICULAR; INTRALESIONAL; INTRAMUSCULAR; SOFT TISSUE ONCE
Status: COMPLETED | OUTPATIENT
Start: 2023-03-28 | End: 2023-03-28

## 2023-03-28 RX ADMIN — IOPAMIDOL 2 ML: 408 INJECTION, SOLUTION INTRATHECAL at 11:52

## 2023-03-28 RX ADMIN — METHYLPREDNISOLONE ACETATE 80 MG: 80 INJECTION, SUSPENSION INTRA-ARTICULAR; INTRALESIONAL; INTRAMUSCULAR; SOFT TISSUE at 11:53

## 2023-03-28 RX ADMIN — BUPIVACAINE HYDROCHLORIDE 10 MG: 5 INJECTION, SOLUTION PERINEURAL at 11:52

## 2023-03-28 NOTE — PROGRESS NOTES
Radiology Discharge Instructions Post Lumbar Puncture  AFTER YOU GO HOME  ? DO relax and take it easy for 24 hours; we suggest bed rest until the next morning, except for getting up to the bathroom  ? You may resume normal activity tomorrow  ? You may remove the bandage on your back in the evening or next morning  ? You may resume bathing the next day  ? Drink at least 4 glasses of extra fluid today if not on a fluid restriction.  ? DO NOT drive or operate machinery at home or at work for at least 24 hours.    CALL YOUR PRIMARY PHYSICIAN IF:  ? If you do start to leak a large amount of fluid from the puncture site, lie down flat on your back. Call your primary physician they will tell you if you need to return to the hospital.  ? You develop severe headache  ? You develop nausea or vomiting.  ? You develop a temperature of 101 degrees F or greater.    ADDITIONAL INSTRUCTIONS:   ? If you are taking a blood thinner, you may resume your medication at your regular dose today.  Follow up with your physician to have your INR rechecked if indicated.  ? You may use over the counter pain reliever, do not use aspirin for 24 hours.    Contacts:  During business hours from 8 to 5 pm, you may call [phone] to reach a nurse advisor.  After hours call [facility] [phone].  Ask for the Radiologist on call.  Someone is on call 24 hrs/day.  [facility] Toll Free Number   [phone]

## 2023-03-28 NOTE — PROGRESS NOTES
Adryan was seen in X-ray today for a lumbar epidural injection. Patient rated pain before procedure 3/10. After procedure patient rated pain 2/10.   This pain level is acceptable to patient. Patient discharged home with .

## 2023-04-04 ENCOUNTER — LAB (OUTPATIENT)
Dept: LAB | Facility: CLINIC | Age: 68
End: 2023-04-04
Payer: MEDICARE

## 2023-04-04 DIAGNOSIS — E78.5 HYPERLIPIDEMIA WITH TARGET LDL LESS THAN 100: ICD-10-CM

## 2023-04-04 DIAGNOSIS — Z91.89 AT RISK FOR DIABETES MELLITUS: ICD-10-CM

## 2023-04-04 LAB
ANION GAP SERPL CALCULATED.3IONS-SCNC: 14 MMOL/L (ref 7–15)
BUN SERPL-MCNC: 15.1 MG/DL (ref 8–23)
CALCIUM SERPL-MCNC: 9.8 MG/DL (ref 8.8–10.2)
CHLORIDE SERPL-SCNC: 101 MMOL/L (ref 98–107)
CHOLEST SERPL-MCNC: 184 MG/DL
CREAT SERPL-MCNC: 1.03 MG/DL (ref 0.67–1.17)
DEPRECATED HCO3 PLAS-SCNC: 28 MMOL/L (ref 22–29)
GFR SERPL CREATININE-BSD FRML MDRD: 79 ML/MIN/1.73M2
GLUCOSE SERPL-MCNC: 169 MG/DL (ref 70–99)
HBA1C MFR BLD: 6 % (ref 0–5.6)
HDLC SERPL-MCNC: 55 MG/DL
LDLC SERPL CALC-MCNC: 101 MG/DL
NONHDLC SERPL-MCNC: 129 MG/DL
POTASSIUM SERPL-SCNC: 4.4 MMOL/L (ref 3.4–5.3)
SODIUM SERPL-SCNC: 143 MMOL/L (ref 136–145)
TRIGL SERPL-MCNC: 142 MG/DL

## 2023-04-04 PROCEDURE — 80061 LIPID PANEL: CPT | Performed by: FAMILY MEDICINE

## 2023-04-04 PROCEDURE — 80048 BASIC METABOLIC PNL TOTAL CA: CPT | Performed by: FAMILY MEDICINE

## 2023-04-04 RX ORDER — ATORVASTATIN CALCIUM 20 MG/1
20 TABLET, FILM COATED ORAL DAILY
Qty: 90 TABLET | Refills: 3 | Status: SHIPPED | OUTPATIENT
Start: 2023-04-04

## 2023-04-20 NOTE — PROGRESS NOTES
DISCHARGE SUMMARY    Adryan Govea was seen 3 times for evaluation and treatment.  Patient did not return for further treatment and current status is unknown.  Due to short treatment duration, no objective or functional changes were made.  Please see goal flow sheet from episode noted date below and initial evaluation for further information.  Patient is discharged from therapy and therapy episode is resolved as of 04/20/23.      Linked Episodes   Type: Episode: Status: Noted: Resolved: Last update: Updated by:   PHYSICAL THERAPY low back / right knee and hip pain 2/15/23 Active 2/15/2023  2/21/2023  8:19 AM Adan Tanner, PT      Comments:

## 2023-08-20 ENCOUNTER — HEALTH MAINTENANCE LETTER (OUTPATIENT)
Age: 68
End: 2023-08-20

## 2023-11-27 ENCOUNTER — MYC MEDICAL ADVICE (OUTPATIENT)
Dept: SLEEP MEDICINE | Facility: CLINIC | Age: 68
End: 2023-11-27
Payer: MEDICARE

## 2023-11-27 DIAGNOSIS — G47.33 OSA (OBSTRUCTIVE SLEEP APNEA): Primary | ICD-10-CM

## 2024-03-25 ENCOUNTER — TRANSFERRED RECORDS (OUTPATIENT)
Dept: HEALTH INFORMATION MANAGEMENT | Facility: CLINIC | Age: 69
End: 2024-03-25
Payer: MEDICARE

## 2024-06-17 ENCOUNTER — TELEPHONE (OUTPATIENT)
Dept: SLEEP MEDICINE | Facility: CLINIC | Age: 69
End: 2024-06-17

## 2024-06-17 NOTE — TELEPHONE ENCOUNTER
Patient needs to be rescheduled for their virtual visit due to Reason for Reschedule: No-Show    Appointment mode: Video  Provider: goltz

## 2024-06-20 NOTE — TELEPHONE ENCOUNTER
Left message for Adryan to see if he is getting his care in [place] or is he continuing care with Benntt Goltz. Direct line provided.

## 2024-08-27 ENCOUNTER — VIRTUAL VISIT (OUTPATIENT)
Dept: ORTHOPEDICS | Facility: CLINIC | Age: 69
End: 2024-08-27
Payer: MEDICARE

## 2024-08-27 DIAGNOSIS — M51.26 LUMBAR HERNIATED DISC: ICD-10-CM

## 2024-08-27 DIAGNOSIS — M51.369 DDD (DEGENERATIVE DISC DISEASE), LUMBAR: Primary | ICD-10-CM

## 2024-08-27 PROCEDURE — 99214 OFFICE O/P EST MOD 30 MIN: CPT | Mod: 95 | Performed by: PREVENTIVE MEDICINE

## 2024-08-27 RX ORDER — DICLOFENAC SODIUM 75 MG/1
75 TABLET, DELAYED RELEASE ORAL 2 TIMES DAILY
Qty: 60 TABLET | Refills: 1 | Status: SHIPPED | OUTPATIENT
Start: 2024-08-27

## 2024-08-27 RX ORDER — GABAPENTIN 100 MG/1
100 CAPSULE ORAL 3 TIMES DAILY
Qty: 90 CAPSULE | Refills: 0 | Status: SHIPPED | OUTPATIENT
Start: 2024-08-27

## 2024-08-27 NOTE — PROGRESS NOTES
Adryan is a 69 year old who is being evaluated via a billable video visit.        Subjective   Adryan is a 69 year old, presenting for the following health issues:  Followup for lumbar radicular pain    Video Start Time: 300pm  Video end time: 320pm    [facility]     Followup for worsening low back pain and radicular pain  Reports that his low back pain has worsened and radiates into legs      Review of Systems  Constitutional, HEENT, cardiovascular, pulmonary, gi and gu systems are negative, except as otherwise noted.      Objective           Vitals:  No vitals were obtained today due to virtual visit.    Physical Exam   GENERAL: alert and no distress  EYES: Eyes grossly normal to inspection.  No discharge or erythema, or obvious scleral/conjunctival abnormalities.  RESP: No audible wheeze, cough, or visible cyanosis.    SKIN: Visible skin clear. No significant rash, abnormal pigmentation or lesions.  NEURO: Cranial nerves grossly intact.  Mentation and speech appropriate for age.  PSYCH: Appropriate affect, tone, and pace of words    Assessment: 70 yo male with lumbar ddd, disc herniations, radicular pain worsened  Reviewed lumbar MRI shows ddd, disc herniations  Rx given gabapentin, voltaren, tizanadine  Discussed and referred to spine surgeon        Video-Visit Details    Type of service:  Video Visit     Originating Location (pt. Location): home    Distant Location (provider location): office  Platform used for Video Visit: Maggie  Signed Electronically by: Kenneth Butler MD

## 2024-08-27 NOTE — LETTER
8/27/2024       RE: Adryan Govea  [address] [facility] [facility] [address]     Dear Colleague,    Thank you for referring your patient, Adryan Govea, to the [facility] [place] [facility]. Please see a copy of my visit note below.    Adryan is a 69 year old who is being evaluated via a billable video visit.        Subjective  Adryan is a 69 year old, presenting for the following health issues:  Followup for lumbar radicular pain    Video Start Time: 300pm  Video end time: 320pm    HPI     Followup for worsening low back pain and radicular pain  Reports that his low back pain has worsened and radiates into legs      Review of Systems  Constitutional, HEENT, cardiovascular, pulmonary, gi and gu systems are negative, except as otherwise noted.      Objective          Vitals:  No vitals were obtained today due to virtual visit.    Physical Exam   GENERAL: alert and no distress  EYES: Eyes grossly normal to inspection.  No discharge or erythema, or obvious scleral/conjunctival abnormalities.  RESP: No audible wheeze, cough, or visible cyanosis.    SKIN: Visible skin clear. No significant rash, abnormal pigmentation or lesions.  NEURO: Cranial nerves grossly intact.  Mentation and speech appropriate for age.  PSYCH: Appropriate affect, tone, and pace of words    Assessment: 70 yo male with lumbar ddd, disc herniations, radicular pain worsened  Reviewed lumbar MRI shows ddd, disc herniations  Rx given gabapentin, voltaren, tizanadine  Discussed and referred to spine surgeon        Video-Visit Details    Type of service:  Video Visit     Originating Location (pt. Location): home    Distant Location (provider location): office  Platform used for Video Visit: [facility]  Signed Electronically by: Kenneth Butler MD      Again, thank you for allowing me to participate in the care of your patient.      Sincerely,    Kenneth Butler MD

## 2024-10-13 ENCOUNTER — HEALTH MAINTENANCE LETTER (OUTPATIENT)
Age: 69
End: 2024-10-13

## 2025-07-10 ENCOUNTER — TRANSFERRED RECORDS (OUTPATIENT)
Dept: ADMINISTRATIVE | Facility: CLINIC | Age: 70
End: 2025-07-10
Payer: MEDICARE

## 2025-07-11 ENCOUNTER — TELEPHONE (OUTPATIENT)
Dept: SLEEP MEDICINE | Facility: CLINIC | Age: 70
End: 2025-07-11
Payer: MEDICARE

## 2025-07-15 PROBLEM — D12.6 COLON ADENOMAS: Status: ACTIVE | Noted: 2025-07-15

## 2025-07-15 NOTE — PROCEDURES
[facility]   [address] [place] [address], [address], [place] [address]     Patient Name: Adryan Govea  Gender:  Male  Exam Date: 07/10/2025 Visit Number:  [id number]  Age: 70 Years YOB: 1955  Attending MD: Heriberto Fu MD Medical Record#:  [id number]    Procedure: Colonoscopy   Indications: Previous adenomatous polyp(s)       Surveillance - tattoo in ascending colon       History of numerous adenomas, genetic work-up in 2014 negative.      Referring MD: Listed Not  Primary MD:      Listed Not  Medications: Intra Procedure Medications:   Patient received monitored anesthesia care.     Complications: No immediate complications  ______________________________________________________________________________  Procedure:   An examination of the heart and lungs was performed and found to be within acceptable limits.  .  The patient was therefore deemed a reasonable candidate for endoscopy and sedation.   The risks and benefits of the procedure were explained to the patient.After obtaining informed consent, the patient received monitored anesthesia care and I passed the scope   without difficulty via the rectum to the cecum.  The appendiceal orifice and ic valve were identified.  The scope was retroflexed during the examination  The quality of the prep was excellent  (Miralax/Gatorade Double Prep).    This was a complete examination throughout the entire colon.    Findings:    Polyp location: ascending colon.  Quantity: 1.  Size: 5 mm.  Polyp shape:  flat lesion.         Maneuver: polypectomy was performed with a cold snare.       Removal:  complete.  Retrieval: complete.  Bleeding: minimal/oozing.    Polyp location: transverse colon.  Quantity: 3.  Size:  3-5 mm.  Polyp shape: flat lesion.         Maneuver: polypectomy was performed with a  cold biopsy forceps and cold snare.       Removal: complete.  Retrieval: complete.  Bleeding: minimal/oozing.    Polyp location: rectum.  Quantity: 4.  Size:   5-6 mm.   Polyp shape: flat lesion.         Maneuver: polypectomy was performed with a cold snare.       Removal: complete.  Retrieval: complete.  Bleeding: minimal/oozing.      Diverticulosis.  Location: - descending colon - sigmoid.    Description:  mild.    Size:  small.    Quantity:  few.  No inflammation present.  Comments: Tattoo in ascending colon negative for polyp.    Impression:  Colorectal polyps  Diverticulosis of colon without diverticulitis    Preliminary Plan:  Repeat colonoscopy in 1 year for polyp surveillance  Return to your primary care provider as needed.  Pathology Results:  A: COLON, ASCENDING, POLYP:           1. Tubular adenoma           2. Negative for high grade dysplasia           3. Per the colonoscopy report:               a. Polyp size: 5 mm               b. Resection: Complete               c. Retrieval: Complete      B: COLON, TRANSVERSE, POLYPS:           1. Tubular adenomas (2) and normal colonic mucosa (endoscopically 3 polyps)           2. Negative for high grade dysplasia           3. Per the colonoscopy report:               a. Polyp sizes: 3 mm - 5 mm               b. Resection: Complete               c. Retrieval: Complete      C: RECTUM, POLYPS:           1. Hyperplastic polyps (3)      MICROSCOPIC  A: Performed   B: Performed   C: Performed     Electronically signed by: PETTY Snider MD    Interpreted at [facility], [address] [place] [id number]    Orders    Diagnostics:  Procedure Comments Timeframe Assessment   Colonoscopy Double prep 1 Year K63.5     Instruction(s)/Education:  Instruction/Education Timeframe Assessment   Colon Cancer Prevention  K57.30   Colon Polyps  K57.30   Diverticulosis/Diverticulitis  K57.30   High Fiber Diet  K57.30       Final Plan:  Repeat colonoscopy in 1 year for Polyp surveillance.    We will attempt to contact you at appropriate intervals via [place] mail.  We may not be able to find you or contact  you at that time, therefore you should know that the responsibility for following our recommendation rests with you.  If you don't hear from us at the time your procedure is due, please contact our office to schedule an appointment.  If your contact information should change, please contact our office so that we can update your record.      _Electronically signed by:___________________  Heriberto Fu MD                 07/10/2025    cc: Listed Not  cc: Listed Not